# Patient Record
Sex: FEMALE | Race: WHITE | NOT HISPANIC OR LATINO
[De-identification: names, ages, dates, MRNs, and addresses within clinical notes are randomized per-mention and may not be internally consistent; named-entity substitution may affect disease eponyms.]

---

## 2019-02-01 PROBLEM — Z00.00 ENCOUNTER FOR PREVENTIVE HEALTH EXAMINATION: Status: ACTIVE | Noted: 2019-02-01

## 2019-02-07 ENCOUNTER — APPOINTMENT (OUTPATIENT)
Dept: HEART AND VASCULAR | Facility: CLINIC | Age: 79
End: 2019-02-07
Payer: MEDICARE

## 2019-02-07 VITALS
BODY MASS INDEX: 23.91 KG/M2 | DIASTOLIC BLOOD PRESSURE: 70 MMHG | OXYGEN SATURATION: 95 % | HEIGHT: 60.63 IN | SYSTOLIC BLOOD PRESSURE: 120 MMHG | WEIGHT: 125 LBS | HEART RATE: 70 BPM

## 2019-02-07 VITALS — DIASTOLIC BLOOD PRESSURE: 70 MMHG | SYSTOLIC BLOOD PRESSURE: 100 MMHG

## 2019-02-07 DIAGNOSIS — E78.5 HYPERLIPIDEMIA, UNSPECIFIED: ICD-10-CM

## 2019-02-07 DIAGNOSIS — Z85.72 PERSONAL HISTORY OF NON-HODGKIN LYMPHOMAS: ICD-10-CM

## 2019-02-07 DIAGNOSIS — R09.89 OTHER SPECIFIED SYMPTOMS AND SIGNS INVOLVING THE CIRCULATORY AND RESPIRATORY SYSTEMS: ICD-10-CM

## 2019-02-07 DIAGNOSIS — R00.2 PALPITATIONS: ICD-10-CM

## 2019-02-07 DIAGNOSIS — F17.200 NICOTINE DEPENDENCE, UNSPECIFIED, UNCOMPLICATED: ICD-10-CM

## 2019-02-07 DIAGNOSIS — C85.90 NON-HODGKIN LYMPHOMA, UNSPECIFIED, UNSPECIFIED SITE: ICD-10-CM

## 2019-02-07 PROCEDURE — 99204 OFFICE O/P NEW MOD 45 MIN: CPT

## 2019-02-07 PROCEDURE — 93000 ELECTROCARDIOGRAM COMPLETE: CPT

## 2019-02-07 NOTE — HISTORY OF PRESENT ILLNESS
[FreeTextEntry1] : 77 yo woman with h/o moderate AS (last ECHO in 2015 with ROD=1.2 cm2) and palpitation with previous Holter showing brief episodes of AT but no definitive Afib here for evaluation.  She reports somewhat decreased ET c/w 2015 although she still exercise 3-4 times in the gym including walking about 10-15 min on the treadmill at low speed.  No CP with mild BRITTON.  Does note frequent episodes of palpitation, 2-3 times per week, "fast heart beat", without dizziness or syncope, lasting a few seconds, with spontaneous resolution, usually happening in the morning.  Relates to intermittent feelings of "coldness", in bilateral legs and feet, especially at night.  Occasional muscle cramps in the leg.  No ulcer or gangrene.  No PND, orthopnea or LE edema.

## 2019-02-07 NOTE — REVIEW OF SYSTEMS
[Shortness Of Breath] : shortness of breath [Palpitations] : palpitations [Fever] : no fever [Chills] : no chills [Blurry Vision] : no blurred vision [Earache] : no earache [Chest Pain] : no chest pain [Cough] : no cough [Wheezing] : no wheezing [Abdominal Pain] : no abdominal pain [Nausea] : no nausea [Vomiting] : no vomiting [Heartburn] : no heartburn [Joint Pain] : no joint pain [Skin: A Rash] : no rash: [Dizziness] : no dizziness [Confusion] : no confusion was observed [Excessive Thirst] : no polydipsia [Easy Bleeding] : no tendency for easy bleeding [Easy Bruising] : no tendency for easy bruising

## 2019-02-07 NOTE — ASSESSMENT
[FreeTextEntry1] : 1. Moderate AS: on previous ECHO from 2015, now with mildly decreased ET by report, need to rule out progression.\par 2. Palpitation: AT on previous Holter, need to repeat to rule out Afib.\par 3. Decreased LLE pulses: suspicious for underlying PAD.\par \par Plan:\par 1. ECHO.\par 2. Event monitor for one week.\par 3. LIAM/PVR.\par 4. RTC in one month.
none

## 2019-02-07 NOTE — PHYSICAL EXAM
[General Appearance - Well Developed] : well developed [General Appearance - Well Nourished] : well nourished [Normal Conjunctiva] : the conjunctiva exhibited no abnormalities [Normal Oral Mucosa] : normal oral mucosa [JVD Elevated _____cm] : JVD elevated [unfilled] ~U cm above clavicle [1+] : right 1+ [0] : left 0 [2+] : left 2+ [] : no respiratory distress [Auscultation Breath Sounds / Voice Sounds] : lungs were clear to auscultation bilaterally [Bowel Sounds] : normal bowel sounds [Abdomen Tenderness] : non-tender [Abdomen Mass (___ Cm)] : no abdominal mass palpated [Abnormal Walk] : normal gait [Nail Clubbing] : no clubbing of the fingernails [Cyanosis, Localized] : no localized cyanosis [Skin Color & Pigmentation] : normal skin color and pigmentation [Oriented To Time, Place, And Person] : oriented to person, place, and time [FreeTextEntry1] : normal carotid upstroke with AV transmitting sound [Bruit] : no bruit heard

## 2019-02-27 ENCOUNTER — APPOINTMENT (OUTPATIENT)
Dept: HEART AND VASCULAR | Facility: CLINIC | Age: 79
End: 2019-02-27
Payer: MEDICARE

## 2019-02-27 PROCEDURE — 93922 UPR/L XTREMITY ART 2 LEVELS: CPT

## 2019-02-27 PROCEDURE — 93306 TTE W/DOPPLER COMPLETE: CPT

## 2019-02-27 PROCEDURE — 93225 XTRNL ECG REC<48 HRS REC: CPT

## 2019-03-21 ENCOUNTER — APPOINTMENT (OUTPATIENT)
Dept: HEART AND VASCULAR | Facility: CLINIC | Age: 79
End: 2019-03-21
Payer: MEDICARE

## 2019-03-21 VITALS
HEART RATE: 91 BPM | OXYGEN SATURATION: 99 % | SYSTOLIC BLOOD PRESSURE: 131 MMHG | DIASTOLIC BLOOD PRESSURE: 90 MMHG | BODY MASS INDEX: 23.71 KG/M2 | TEMPERATURE: 98.3 F | HEIGHT: 60.63 IN | WEIGHT: 123.99 LBS

## 2019-03-21 PROCEDURE — 99214 OFFICE O/P EST MOD 30 MIN: CPT

## 2019-03-21 NOTE — ASSESSMENT
[FreeTextEntry1] : 1. AS: looks like it has progressed to severe, however, pt adamantly denies symptoms and descriptions of ET are contradictory.\par 2. Palpitation: again with brief Afib, [id number] score of 3 mostly because of age and gender.  Pt is reluctant to take any meds, despite clearly explained the risk of thromboembolism.  Finally, she agrees to take ASA first.\par 3. LE "twitching": without sig PAD by LIAM, ?restless leg syndrome.\par \par Plan:\par 1. EKG exercise stress test to evaluate exercise tolerance and BP response to exercise.\par 2. RTC in 2 weeks.

## 2019-03-21 NOTE — REVIEW OF SYSTEMS
[Fever] : no fever [Chills] : no chills [Blurry Vision] : no blurred vision [Earache] : no earache [Shortness Of Breath] : no shortness of breath [Chest Pain] : no chest pain [Lower Ext Edema] : no extremity edema [Cough] : no cough [Abdominal Pain] : no abdominal pain [Dysuria] : no dysuria [Dizziness] : no dizziness [Confusion] : no confusion was observed [Excessive Thirst] : no polydipsia [Easy Bruising] : no tendency for easy bruising

## 2019-03-21 NOTE — HISTORY OF PRESENT ILLNESS
[FreeTextEntry1] : Feeling about the same with almost daily palpitation lasting a few seconds.  Claims to have normal exercise tolerance without exertional chest pain or BRITTON.  Reports exercise in Gym 3 x/week including 15 min of treadmill but at the same time saying she rarely walks outside of the house.  No PND, orthopnea or LE edema.  No dizziness or syncope.  Again c/o leg "twitching" at night but no pain.  Only takes metoprolol prn and not taking ASA.

## 2019-03-21 NOTE — PHYSICAL EXAM
[General Appearance - Well Developed] : well developed [General Appearance - Well Nourished] : well nourished [Normal Conjunctiva] : the conjunctiva exhibited no abnormalities [Normal Oral Mucosa] : normal oral mucosa [JVD Elevated _____cm] : JVD elevated [unfilled] ~U cm above clavicle [] : no respiratory distress [Auscultation Breath Sounds / Voice Sounds] : lungs were clear to auscultation bilaterally [Heart Rate And Rhythm] : heart rate and rhythm were normal [Bowel Sounds] : normal bowel sounds [Abnormal Walk] : normal gait [Nail Clubbing] : no clubbing of the fingernails [Skin Color & Pigmentation] : normal skin color and pigmentation [Oriented To Time, Place, And Person] : oriented to person, place, and time [FreeTextEntry1] : IV/VI coarse GORDO LSB, faint A2

## 2019-04-04 ENCOUNTER — APPOINTMENT (OUTPATIENT)
Dept: HEART AND VASCULAR | Facility: CLINIC | Age: 79
End: 2019-04-04
Payer: MEDICARE

## 2019-04-04 PROCEDURE — ZZZZZ: CPT

## 2019-04-04 PROCEDURE — 93015 CV STRESS TEST SUPVJ I&R: CPT

## 2019-04-11 ENCOUNTER — APPOINTMENT (OUTPATIENT)
Dept: HEART AND VASCULAR | Facility: CLINIC | Age: 79
End: 2019-04-11
Payer: MEDICARE

## 2019-04-11 VITALS
BODY MASS INDEX: 23.71 KG/M2 | OXYGEN SATURATION: 97 % | DIASTOLIC BLOOD PRESSURE: 70 MMHG | SYSTOLIC BLOOD PRESSURE: 126 MMHG | HEIGHT: 60.63 IN | TEMPERATURE: 98.2 F | WEIGHT: 123.99 LBS | HEART RATE: 79 BPM

## 2019-04-11 DIAGNOSIS — I48.0 PAROXYSMAL ATRIAL FIBRILLATION: ICD-10-CM

## 2019-04-11 PROCEDURE — 99214 OFFICE O/P EST MOD 30 MIN: CPT

## 2019-04-11 NOTE — HISTORY OF PRESENT ILLNESS
[FreeTextEntry1] : Feeling about the same.  Mild BRITTON upon brisk walking but no exertional chest pain, PND, orthonpea, LE edema or dizziness.  Intermittent palpitation, lasting for seconds, without dizziness.

## 2019-04-11 NOTE — ASSESSMENT
[FreeTextEntry1] : 1. Aortic stenosis: severe, but relatively asymptomatic, with normal exercise tolerance and hemodynamics on EST for age.  Case discussed with structural heart disease director Dr. Guo who recommend watchful waiting strategy with every 3 months clinical and every 6 months ECHO follow up.\par 2. Atrial fibrillation: with brief paroxysmal Afib, not on anticoagulation.  [id number] score 2 with only age and gender, given recent data suggesting female gender may not be a risk factor by itself, it is unclear to me that the patient should be on anticoagulation.  Will consult EP.\par 3. Hyperlipidemia: should probably be on statin, especially it may have marginal benefit for aortic stenosis.\par \par Plan:\par 1. EP consult with Dr. William Helton.\par 2. Consider starting Lipitor 20 mg po qd.  Pt would like to discuss with PCP Dr. Zepeda before starting.\par 3. RTC in 3 months in [place] office.  Pt is warned to return earlier if she develops exertional chest pain, dizziness/syncope or worsening dyspnea.

## 2019-04-11 NOTE — REASON FOR VISIT
[Consultation] : a consultation regarding [FreeTextEntry2] : aortic stenosis and atrial fibrillation

## 2019-04-11 NOTE — REVIEW OF SYSTEMS
[Fever] : no fever [Chills] : no chills [Blurry Vision] : no blurred vision [Shortness Of Breath] : shortness of breath [Lower Ext Edema] : no extremity edema [Chest Pain] : no chest pain [Leg Claudication] : no intermittent leg claudication [Abdominal Pain] : no abdominal pain [Cough] : no cough [Palpitations] : palpitations [Dysuria] : no dysuria [Joint Pain] : no joint pain [Confusion] : no confusion was observed [Dizziness] : no dizziness [Excessive Thirst] : no polydipsia [Easy Bleeding] : no tendency for easy bleeding

## 2019-04-11 NOTE — PHYSICAL EXAM
[General Appearance - Well Developed] : well developed [Normal Conjunctiva] : the conjunctiva exhibited no abnormalities [Normal Oral Mucosa] : normal oral mucosa [General Appearance - Well Nourished] : well nourished [JVD Elevated _____cm] : JVD elevated [unfilled] ~U cm above clavicle [Auscultation Breath Sounds / Voice Sounds] : lungs were clear to auscultation bilaterally [] : no respiratory distress [Bowel Sounds] : normal bowel sounds [Abnormal Walk] : normal gait [Cyanosis, Localized] : no localized cyanosis [FreeTextEntry1] : No edema [Nail Clubbing] : no clubbing of the fingernails [Skin Color & Pigmentation] : normal skin color and pigmentation [Oriented To Time, Place, And Person] : oriented to person, place, and time

## 2019-04-18 PROBLEM — I48.0 PAROXYSMAL ATRIAL FIBRILLATION: Status: ACTIVE | Noted: 2019-04-18

## 2021-11-06 ENCOUNTER — EMERGENCY (EMERGENCY)
Facility: HOSPITAL | Age: 81
LOS: 1 days | Discharge: LEFT BEFORE DISCHARGE | End: 2021-11-06
Attending: EMERGENCY MEDICINE | Admitting: EMERGENCY MEDICINE
Payer: MEDICARE

## 2021-11-06 VITALS
DIASTOLIC BLOOD PRESSURE: 53 MMHG | TEMPERATURE: 98 F | HEART RATE: 61 BPM | OXYGEN SATURATION: 97 % | RESPIRATION RATE: 18 BRPM | SYSTOLIC BLOOD PRESSURE: 118 MMHG

## 2021-11-06 DIAGNOSIS — Z20.822 CONTACT WITH AND (SUSPECTED) EXPOSURE TO COVID-19: ICD-10-CM

## 2021-11-06 DIAGNOSIS — R77.8 OTHER SPECIFIED ABNORMALITIES OF PLASMA PROTEINS: ICD-10-CM

## 2021-11-06 DIAGNOSIS — R07.9 CHEST PAIN, UNSPECIFIED: ICD-10-CM

## 2021-11-06 DIAGNOSIS — I48.91 UNSPECIFIED ATRIAL FIBRILLATION: ICD-10-CM

## 2021-11-06 DIAGNOSIS — J90 PLEURAL EFFUSION, NOT ELSEWHERE CLASSIFIED: ICD-10-CM

## 2021-11-06 DIAGNOSIS — J81.1 CHRONIC PULMONARY EDEMA: ICD-10-CM

## 2021-11-06 PROCEDURE — 99285 EMERGENCY DEPT VISIT HI MDM: CPT

## 2021-11-06 PROCEDURE — 93010 ELECTROCARDIOGRAM REPORT: CPT

## 2021-11-06 NOTE — ED ADULT TRIAGE NOTE - ARRIVAL INFO ADDITIONAL COMMENTS
pt c/o chest pain, dizziness and intermittent vomiting for 2 days.   pt used "a machine" that told them she possibly had Afib

## 2021-11-06 NOTE — ED ADULT NURSE NOTE - OBJECTIVE STATEMENT
Pt presented to the ED with complaints of chest pain for the last two week. As per pt, chest pain has worsen which prompted her to come to the ED. Pt is accompanied by daughter who patients wants to interpret. Pt is alert and oriented, ambulatory, able to move all extremities, denies fever, nausea, vomiting. lightheadedness, or dizziness.

## 2021-11-06 NOTE — ED ADULT NURSE NOTE - SKIN TEMPERATURE MOISTURE
[facility] CRITICAL CARE SERVICE     PROGRESS NOTE    Patient: Josh Arenas Date: 9/12/2021   female, 84 year old  Admit Date: 9/10/2021   Attending: Loi Graf MD Primary Care Physician: Vitaliy Souza MD       ICU admission Date: 9/10/2021      Operation: S/P Right mini thoracotomy tricuspid valve replacement (31mm Mosaic tissue valve), right groin access for cardiopulmonary bypass 9/10/2021     Post-operative Day: 2     Summary: See Below                                                                                   SUMMARY OF PLAN    1. POD 2 TVreplacement: ASA / b-blocker  2. Cough: guaifenesin 1200 mg po q12h  3. Hypoxemia: resolving, on room air  4. Fluid overload: furosemide  5. Will follow peripherally for respiratory issues  -----------------------------     ----------------------------------------------------------------------------------------------------------------------------------                                   Comprehensive Problem List      Principal Problem: Tricuspid valve regurgitation [I07.1]  S/P TVR (tricuspid valve repair) [Z98.890]   ICU admission Date: 9/10/2021    LOS: 2 day           Operation: S/P Right mini thoracotomy tricuspid valve replacement (31mm Mosaic tissue valve), right groin access for cardiopulmonary bypass 9/10/2021   Operation Date: 9/10/2021   Post-operative Day: 2            Neuro/Psych:   · Acute post operative pain: acetaminophen 650 mg po qid  · degenerative joint disease  · Trigeminal neuralgia  -insomnia prophylaxis/sleep hygiene  Cardiovascular:   · S/P Right mini thoracotomy tricuspid valve replacement (31mm Mosaic tissue valve), right groin access for cardiopulmonary bypass 9/10/2021: ASA 81 mg po qdaily, metoprolol 25 mg po q12h  · Chronic systolic heart failure  · Chronic atrial fibrillation: apixaBAN (ELIQUIS) 5 MG Tab po q12h  · Cardiomyopathy  · Pulmonary hypertension, mild  · 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  Renal/Metabolic:   Weight: 71 kg -> Weight: 72.2 kg, Admit: Weight: 70.3 kg; Weight change: 1.892 kg  · Fluid overload  · Azotemia  · Chronic kidney disease, stage 3, mod decreased GFR  · Elevated Creatinine     Temp  Min: 97.4 °F (36.3 °C)  Max: 99 °F (37.2 °C);   Lab Results   Component Value Date    WBC 14.5 (H) 09/12/2021   , Resp  Min: 9  Max: 47;   Lab Results   Component Value Date    APCO2 45 09/11/2021   ; Pulse  Min: 73  Max: 106    Disposition/Goals of Care:   -Code status: Full Code \"  Code Status: Full Resuscitation\"  -Goals of Care: no care limitations noted  ============================================================================    Subjective  Pain controlled and Tolerating diet    Daily Review of Systems  Respiratory:  negative  Cardiovascular:  negative  Gastrointestinal:  negative    Physical Exam ( high: 12 elements; moderate: 6 )  Hemodynamic parameters for last 24 hours:  PAP (mmHg): (35-47)/(12-18) 36/13  CVP:  [4 mmHg-18 mmHg] 16 mmHg  CO:  [4.6 l/min-5.3 l/min] 5.1 l/min  CI:  [2.6 l/min/m2-3 l/min/m2] 2.8 l/min/m2  Vital signs in last 24 hours:  Temp:  [97.4 °F (36.3 °C)-99 °F (37.2 °C)] 97.6 °F (36.4 °C)  Heart Rate:  [] 100  Resp:  [9-47] 23  BP: ()/(51-69) 131/69  Arterial Line BP: ()/(44-63) 69/58  General appearance: Well developed and no acute distress  Neurologic exam: Glascow coma scale:  Eye opening-  4: Spontaneously, Verbal- 5:  oriented, Motor-  6: Obeys commands  CV exam: RRR, with S1, S2 and perfusion: extremities warm, capillary refill < 3 seconds  Thoracic Exam: clear to ausultation, no wheezes, no rubs, no rhonchi, rales  Abdominal Exam:soft, non-tender, not distended, no guarding or rebound  Eye: non-icteric, eomi  Skin: warm, dry and intact      Reviewed Data Points:  Allergies, Medications, Labs, Imaging and Physician and Nursing Notes  ================================================================  Data  (all assessments, except rhythm, is data obtained from medical record and does not represent data personally collected by author, except the defined cardiac rhythm.)   Neuro/Psych:    RN Assessment Last Value 24 Hour Range   GCS Score 15 (09/12/21 0800)  GCS Score  Min: 15  Max: 15   BIS   No data recorded   ICP      No data recorded  No data recorded     GCS  Is Patient Receiving Medication to Decrease LOC?: No (09/12/21 0800)  Symptoms of Increased ICP: None (09/12/21 0800)  Eye Opening: Spontaneous (09/12/21 0800)  Verbal Response: Oriented and converses (09/12/21 0800)  Motor Response: Obeys verbal commands (09/12/21 0800)  GCS Score: 15 (09/12/21 0800)   Eye Spontaneous (09/12/21 0800)    Verbal Oriented and converses (09/12/21 0800)    Motor Obeys verbal commands (09/12/21 0800)    L Pupil     R Pupil     ICP Symptoms None (09/12/21 0800)   CAM-ICU     EMILY           Level of Consciousness: Lethargic   Pain Score Pain Assessment Tool: Numeric Rating Scale 0-10,         No results found for: OSMO  No results found for this or any previous visit.       Cardio-  Vascular: Sinus rhythm  100 Pulse  Min: 73  Max: 106   Blood Pressure 131/69 BP  Min: 90/53  Max: 131/69   Art BP (!) 69/58 Arterial Line BP  Min: 69/58  Max: 132/63   CVP (!) 16 mmHg CVP (mmHg)  Min: 4 mmHg  Max: 18 mmHg     SvO2 (!) 82 % SVO2  Min: 73 %  Max: 83 %   Cardiac Output 5.1 l/min Cardiac Output (l/min)  Min: 4.6 l/min  Max: 5.3 l/min   Cardiac index 2.8  l/min/m2 Cardiac Index (l/min/m2)  Min: 2.6 l/min/m2  Max: 3 l/min/m2     RN Assessment Temporary PACER   Intrinsic Rate     Intrinsic Rhythm     Pacer Mode     Pacer Rate  ,     Atrial Output     Ventricular Output     Capture  ,     Sensing       LVAD      Speed (RPM)     Power (Schneider)     Flow (L/min)      Pulsatility Index        Recent Labs   Lab 09/10/21  1656   VSAT 82*     Lab Results   Component Value Date    RAPDTR <0.02 07/09/2015    CPK 62 03/07/2017     (H) 05/08/2018     (H) 05/09/2016     Results for orders placed or performed during the hospital encounter of 08/23/21   Electrocardiogram 12-Lead   Result Value Ref Range    Ventricular Rate EKG/Min (BPM) 103     Atrial Rate (BPM) 40     QRS-Interval (MSEC) 122     QT-Interval (MSEC) 410     QTc 537     R Axis (Degrees) -120     T Axis (Degrees) 56     REPORT TEXT       Ventricular-paced rhythm  and  with occasional  premature ventricular complexes  Abnormal ECG  When compared with ECG of  20-JUL-2021 16:29,  premature ventricular complexes  are now  present  Confirmed by VARUN HERRMANN ANWER ([id number]) on 8/23/2021 6:49:56 PM         Respiratory:   Respiratory Rate (!) 23 Resp  Min: 9  Max: 47   SpO2 (3 L/min) 93 % SpO2  Min: 92 %  Max: 100 %     O2 Vent Settings Last Value   Mode Pressure Support   Rate 16   Tidal Volume     Peak Inspiratory Pressure     Plateau Pressure 19 cm H2O   Pressure Support 8 cm H20   PEEP/CPAP/EPAP 6 cm H20   FiO2 40 %   IBW  IBW/kg (Calculated) FEMALE: 59.3 (09/10/21 0855)   IBW/kg (Calculated) Male: 63.8 (09/10/21 0855)     Recent Labs   Lab 09/11/21  0438 09/11/21  0012 09/10/21  2236   APH 7.31* 7.30* 7.35   APCO2 45 43 41   APO2 110* 126* 160*   ASAT 98 99 100*   AHCO3 23 21* 23       RT Assessments:   Sputum Quality(most recent):  ,  ,     Incentive Spirometry  ; breaths     IPPB PEP/PAP   Chest PT       Results for orders placed during the hospital encounter of 09/10/21    XR Chest AP or PA    Narrative  XR  CHEST AP OR PA    HISTORY: chest tube removal    COMPARISON: 9/11/2021    FINDINGS:    Removal of right-sided chest tubes. No obvious pneumothorax.    Stable enlargement of cardiac silhouette. Pulmonary arterial line has been  removed. Right jugular line terminates in lower SVC. Stable pacemaker  device. Normal pulmonary vasculature. Moderately improved lung aeration  with minor atelectatic opacities in mid and lower lungs. No effusion. No  acute osseous abnormalities.    Impression  1.  Removal of right-sided chest tubes without obvious pneumothorax.    2.  Moderately improved lung aeration.    3.  Removal of pulmonary arterial line.        Renal: Weight: 72.2 kg, Admit: Weight: 70.3 kg  I/O last 3 completed shifts:  In: 905.9 [P.O.:360; I.V.:545.9]  Out: 940 [Urine:870; Chest Tube:70]    Intake/Output Summary (Last 24 hours) at 9/12/2021 0932  Last data filed at 9/12/2021 0800  Gross per 24 hour   Intake 905.9 ml   Output 800 ml   Net 105.9 ml       Recent Labs   Lab 09/12/21  0321 09/11/21  0433 09/10/21  1644 09/10/21  0957   SODIUM 135  --   --   --    POTASSIUM 4.7  --  3.8 3.9   CHLORIDE 105  --   --   --    CO2 23  --   --   --    BUN 37*  --   --   --    CREATININE 1.96*  --   --   --    GLUCOSE 160*  --   --   --    ANIONGAP 12  --   --   --    MG 2.7* 2.8* 1.8  --        No results found    GI/Hepatic:     [REMOVED] GI Tube 09/10/21-Tube Status: Low intermittent suction          RN Assessment Enteral Nutrition   Type      Rate      Residual      Last BM     Stool           No results found    Invalid input(s): ALKP  MELD-Na score: 15 at 8/23/2021 12:32 PM  MELD score: 15 at 8/23/2021 12:32 PM  Calculated from:  Serum Creatinine: 1.35 mg/dL at 8/23/2021 12:32 PM  Serum Sodium: 137 mmol/L at 8/23/2021 12:32 PM  Total Bilirubin: 1.8 mg/dL at 8/23/2021 12:32 PM  INR(ratio): 1.3 at 8/23/2021 12:32 PM  Age: 84 years  No results found for this or any previous visit.     No results found for this or any previous  visit.      Heme/Onc: Recent Labs   Lab 09/12/21  0321 09/11/21  0434 09/11/21  0433 09/10/21  1644   HGB 10.9*  --  10.9* 11.9*   HCT 34.5*  --  34.8* 36.6   PLT 81*  --  76* 86*   PTT  --   --   --  27   INR 1.3 1.3  --  1.5     Lab Results   Component Value Date    MCV 83.9 09/12/2021    MCHC 31.6 (L) 09/12/2021    MCH 26.5 09/12/2021     No results found for: AT3A  Anti-Embolism Devices  VTE Mechanical Prophylaxis Devices (in use): RLE Below knee (BK) anti-embolism stockings (TEDS), LLE Below knee (BK) anti-embolism stockings (TEDS)  RLE Below knee (BK) anti-embolism stockings (TEDS) : On  LLE Below knee (BK) anti-embolism stockings (TEDS) : On  RLE Intermittent Sequential Compression Device (SCDs): On  LLE Intermittent Sequential Compression Device (SCDs): On  ACT:      Endo: BMI 25.69  Lab Results   Component Value Date    TSH 2.409 07/20/2021    HGBA1C 6.7 (H) 08/23/2021    CHOLESTEROL 131 07/30/2021    TRIGLYCERIDE 62 07/30/2021    HDL 71 07/30/2021    CALCLDL 48 07/30/2021        Recent Labs   Lab 09/11/21  1149 09/11/21  1629 09/11/21  2105   GLUCOSE BEDSIDE 178* 170* 196*       ID/Immuno: Temperature: 97.6 °F (36.4 °C), Temp  Min: 97.4 °F (36.3 °C)  Max: 99 °F (37.2 °C)  Recent Labs   Lab 09/12/21  0321 09/11/21  0433 09/10/21  1644   WBC 14.5* 11.0 9.8     No results found for: CRP, MRSAPC  No results found  Lab Results   Component Value Date    HCAB NEGATIVE 11/04/2018    HSAB NEGATIVE 11/04/2018    HCV NEGATIVE 11/04/2018     Anaerobic & Aerobic culture & Smear  No results found for this or any previous visit.  Blood Culture  Results for orders placed or performed during the hospital encounter of 11/04/18   Blood Culture    Specimen: Blood   Result Value Ref Range    Specimen Description BLOOD LEFT BRENDAN     CULTURE NO GROWTH 5 DAYS.     REPORT STATUS 11/09/2018 FINAL      CSF Cell Count & Diff  No results found for this or any previous visit.   No results found for this or any previous visit.  Urine  Culture  No results found for this or any previous visit.  Urinalysis:  Lab Results   Component Value Date    UPROT Negative 08/23/2021    UGLU Negative 08/23/2021    UROB 0.2 08/23/2021    UWBC Negative 08/23/2021    UNITR Negative 08/23/2021    URBC Small (A) 08/23/2021    UBACTR NONE SEEN 11/04/2018     CVC Double Lumen 09/10/21 Right Jugular (Active)   Clinical Necessity No 09/11/21 2000   Site Assessment WDL 09/12/21 0800   Dressing Type Chlorhexidine patch;Transparent 09/12/21 0800   Dressing Changed On   09/10/21 09/12/21 0800   Port 1 Lumen Cap Applied/Changed On 09/10/21 09/12/21 0800   Port 1 Line Status Infusing 09/12/21 0800   Port 2 Lumen Cap Applied/Changed On 09/10/21 09/12/21 0800   Port 2 Line Status Capped 09/12/21 0800       Peripheral IV 09/10/21 Right Antecubital 20 (Active)   Site Assessment WDL 09/12/21 0800   Dressing Type Transparent 09/12/21 0800   Dressing Activity Applied 09/10/21 0957   Dressing Changed On   09/10/21 09/12/21 0800   Lumen Cap Applied/Changed On 09/10/21 09/12/21 0800   Line Status Line flushed;No blood return 09/12/21 0800   Interventions Capped IV 09/12/21 0800       Chest Tube (Active)      Integument/  Rheum/Rehab: Skin (RN Assessment)  Jon    Wound Cover      Physical Therapy (PT Assessment)                       Social/Ethics: Advanced Directives:               Austin Eduardo MD  9/12/2021         warm

## 2021-11-07 VITALS — SYSTOLIC BLOOD PRESSURE: 135 MMHG | OXYGEN SATURATION: 93 % | HEART RATE: 55 BPM | DIASTOLIC BLOOD PRESSURE: 48 MMHG

## 2021-11-07 LAB
ALBUMIN SERPL ELPH-MCNC: 3.8 G/DL — SIGNIFICANT CHANGE UP (ref 3.3–5)
ALP SERPL-CCNC: 156 U/L — HIGH (ref 40–120)
ALT FLD-CCNC: 160 U/L — HIGH (ref 10–45)
ANION GAP SERPL CALC-SCNC: 14 MMOL/L — SIGNIFICANT CHANGE UP (ref 5–17)
APTT BLD: 23.4 SEC — LOW (ref 27.5–35.5)
AST SERPL-CCNC: 99 U/L — HIGH (ref 10–40)
BASE EXCESS BLDV CALC-SCNC: -2.9 MMOL/L — LOW (ref -2–3)
BASOPHILS # BLD AUTO: 0.04 K/UL — SIGNIFICANT CHANGE UP (ref 0–0.2)
BASOPHILS NFR BLD AUTO: 0.3 % — SIGNIFICANT CHANGE UP (ref 0–2)
BILIRUB SERPL-MCNC: 0.4 MG/DL — SIGNIFICANT CHANGE UP (ref 0.2–1.2)
BUN SERPL-MCNC: 26 MG/DL — HIGH (ref 7–23)
CA-I SERPL-SCNC: 1.08 MMOL/L — LOW (ref 1.15–1.33)
CALCIUM SERPL-MCNC: 8.8 MG/DL — SIGNIFICANT CHANGE UP (ref 8.4–10.5)
CHLORIDE SERPL-SCNC: 103 MMOL/L — SIGNIFICANT CHANGE UP (ref 96–108)
CO2 BLDV-SCNC: 25.1 MMOL/L — SIGNIFICANT CHANGE UP (ref 22–26)
CO2 SERPL-SCNC: 21 MMOL/L — LOW (ref 22–31)
CREAT SERPL-MCNC: 1.04 MG/DL — SIGNIFICANT CHANGE UP (ref 0.5–1.3)
EOSINOPHIL # BLD AUTO: 0.05 K/UL — SIGNIFICANT CHANGE UP (ref 0–0.5)
EOSINOPHIL NFR BLD AUTO: 0.4 % — SIGNIFICANT CHANGE UP (ref 0–6)
GAS PNL BLDV: 127 MMOL/L — LOW (ref 136–145)
GAS PNL BLDV: SIGNIFICANT CHANGE UP
GLUCOSE SERPL-MCNC: 167 MG/DL — HIGH (ref 70–99)
HCO3 BLDV-SCNC: 24 MMOL/L — SIGNIFICANT CHANGE UP (ref 22–29)
HCT VFR BLD CALC: 40.3 % — SIGNIFICANT CHANGE UP (ref 34.5–45)
HGB BLD-MCNC: 13.1 G/DL — SIGNIFICANT CHANGE UP (ref 11.5–15.5)
IMM GRANULOCYTES NFR BLD AUTO: 0.5 % — SIGNIFICANT CHANGE UP (ref 0–1.5)
INR BLD: 1.09 — SIGNIFICANT CHANGE UP (ref 0.88–1.16)
LYMPHOCYTES # BLD AUTO: 0.89 K/UL — LOW (ref 1–3.3)
LYMPHOCYTES # BLD AUTO: 6.8 % — LOW (ref 13–44)
MCHC RBC-ENTMCNC: 30.8 PG — SIGNIFICANT CHANGE UP (ref 27–34)
MCHC RBC-ENTMCNC: 32.5 GM/DL — SIGNIFICANT CHANGE UP (ref 32–36)
MCV RBC AUTO: 94.6 FL — SIGNIFICANT CHANGE UP (ref 80–100)
MONOCYTES # BLD AUTO: 0.73 K/UL — SIGNIFICANT CHANGE UP (ref 0–0.9)
MONOCYTES NFR BLD AUTO: 5.5 % — SIGNIFICANT CHANGE UP (ref 2–14)
NEUTROPHILS # BLD AUTO: 11.39 K/UL — HIGH (ref 1.8–7.4)
NEUTROPHILS NFR BLD AUTO: 86.5 % — HIGH (ref 43–77)
NRBC # BLD: 0 /100 WBCS — SIGNIFICANT CHANGE UP (ref 0–0)
NT-PROBNP SERPL-SCNC: HIGH PG/ML (ref 0–300)
PCO2 BLDV: 47 MMHG — HIGH (ref 39–42)
PH BLDV: 7.31 — LOW (ref 7.32–7.43)
PLATELET # BLD AUTO: 207 K/UL — SIGNIFICANT CHANGE UP (ref 150–400)
PO2 BLDV: <35 MMHG — SIGNIFICANT CHANGE UP (ref 25–45)
POTASSIUM BLDV-SCNC: 8.2 MMOL/L — CRITICAL HIGH (ref 3.5–5.1)
POTASSIUM SERPL-MCNC: 5 MMOL/L — SIGNIFICANT CHANGE UP (ref 3.5–5.3)
POTASSIUM SERPL-SCNC: 5 MMOL/L — SIGNIFICANT CHANGE UP (ref 3.5–5.3)
PROT SERPL-MCNC: 6.7 G/DL — SIGNIFICANT CHANGE UP (ref 6–8.3)
PROTHROM AB SERPL-ACNC: 13 SEC — SIGNIFICANT CHANGE UP (ref 10.6–13.6)
RBC # BLD: 4.26 M/UL — SIGNIFICANT CHANGE UP (ref 3.8–5.2)
RBC # FLD: 14.7 % — HIGH (ref 10.3–14.5)
SAO2 % BLDV: 47.2 % — LOW (ref 67–88)
SARS-COV-2 RNA SPEC QL NAA+PROBE: NEGATIVE — SIGNIFICANT CHANGE UP
SODIUM SERPL-SCNC: 138 MMOL/L — SIGNIFICANT CHANGE UP (ref 135–145)
TROPONIN T SERPL-MCNC: 0.03 NG/ML — HIGH (ref 0–0.01)
WBC # BLD: 13.16 K/UL — HIGH (ref 3.8–10.5)
WBC # FLD AUTO: 13.16 K/UL — HIGH (ref 3.8–10.5)

## 2021-11-07 PROCEDURE — 71045 X-RAY EXAM CHEST 1 VIEW: CPT

## 2021-11-07 PROCEDURE — 83880 ASSAY OF NATRIURETIC PEPTIDE: CPT

## 2021-11-07 PROCEDURE — 96374 THER/PROPH/DIAG INJ IV PUSH: CPT

## 2021-11-07 PROCEDURE — 84484 ASSAY OF TROPONIN QUANT: CPT

## 2021-11-07 PROCEDURE — 84132 ASSAY OF SERUM POTASSIUM: CPT

## 2021-11-07 PROCEDURE — 82330 ASSAY OF CALCIUM: CPT

## 2021-11-07 PROCEDURE — 84295 ASSAY OF SERUM SODIUM: CPT

## 2021-11-07 PROCEDURE — 71275 CT ANGIOGRAPHY CHEST: CPT | Mod: MA

## 2021-11-07 PROCEDURE — 85025 COMPLETE CBC W/AUTO DIFF WBC: CPT

## 2021-11-07 PROCEDURE — 71275 CT ANGIOGRAPHY CHEST: CPT | Mod: 26,MA

## 2021-11-07 PROCEDURE — 87635 SARS-COV-2 COVID-19 AMP PRB: CPT

## 2021-11-07 PROCEDURE — 82803 BLOOD GASES ANY COMBINATION: CPT

## 2021-11-07 PROCEDURE — 85730 THROMBOPLASTIN TIME PARTIAL: CPT

## 2021-11-07 PROCEDURE — 36415 COLL VENOUS BLD VENIPUNCTURE: CPT

## 2021-11-07 PROCEDURE — 99284 EMERGENCY DEPT VISIT MOD MDM: CPT | Mod: 25

## 2021-11-07 PROCEDURE — 85610 PROTHROMBIN TIME: CPT

## 2021-11-07 PROCEDURE — 71045 X-RAY EXAM CHEST 1 VIEW: CPT | Mod: 26

## 2021-11-07 PROCEDURE — 93005 ELECTROCARDIOGRAM TRACING: CPT

## 2021-11-07 PROCEDURE — 80053 COMPREHEN METABOLIC PANEL: CPT

## 2021-11-07 RX ORDER — FUROSEMIDE 40 MG
20 TABLET ORAL ONCE
Refills: 0 | Status: COMPLETED | OUTPATIENT
Start: 2021-11-07 | End: 2021-11-07

## 2021-11-07 RX ADMIN — Medication 20 MILLIGRAM(S): at 02:04

## 2021-11-07 NOTE — ED PROVIDER NOTE - PROGRESS NOTE DETAILS
[place]: PT states her pain resolved and her breathing improved. WBC 13, alk phos 156, AST 99,  , trop 0.03, BNP 78538. CTA prelim showing "1.  No pulmonary embolism.  2.  Pulmonary edema.  3.  Large right and moderate left pleural effusions.  4.  Main pulmonary artery dilation which can be seen with pulmonary hypertension.  5.  Cardiomegaly.  Pt w/ likely new CHF, possible 2/2 valvular disease/ACS. Joanne:  Pt is clinically sober, A&Ox3, free from distracting injury. Throughout our interactions in the Emergency Department today, the pt has demonstrated concrete thinking/reasoning, has maintained an orderly & reasonable conversation, appears to have intact insight/judgment/reason, a sound mind & therefore in my opinion has capacity now to make decisions.  Given the pt’s presentation, I explained, in laymen's terms, my concern for  MI/ACS/CHF/pulmonary edema.  The pt verbalized an understanding of my worries. I've communicated to the pt that the ED evaluation is incomplete.  I have discussed the need for further ED/hospital w/u.  I have reviewed the range of possible dx, potential testing & tx options.  Our discussions included the potential outcomes of leaving AMA, including worsening of their condition, becoming permanently disabled/in pain/critically ill, & death.  Despite these efforts, I was unable to persuade the pt to stay.  The pt is refusing any further ED care & is leaving AMA. I have attempted to offer tx/rx/guidance for any dangerous conditions which are most likely and/or dangerous.  I have answered all questions & have implored the pt to return to the ED ASAP to complete the w/u.

## 2021-11-07 NOTE — ED PROVIDER NOTE - CARE PLAN
1 Principal Discharge DX:	Elevated troponin  Secondary Diagnosis:	Pulmonary edema  Secondary Diagnosis:	Pleural effusion

## 2021-11-07 NOTE — ED PROVIDER NOTE - CLINICAL SUMMARY MEDICAL DECISION MAKING FREE TEXT BOX
81F PMH afib (on metoprolol, no AC), "valvular disease," p/w intermittent midsternal CP, x2-3w, intermittent. Associated w/ SOB. Worse tonight so came to ED. Today w NBNB NV x2. Asa 324 given by EMS. No other systemic symptoms. Pt has not seen cards Dr. Parsons in >1.5yrs but has f/u this wednesday. No other systemic symptoms. Vitals wnl, exam as above.  ddx: concern for ACS, possible heart failure/worsening valvular disease.  Labs, XR, reassess.

## 2021-11-07 NOTE — ED PROVIDER NOTE - OBJECTIVE STATEMENT
[place], prefers daughter at bedside to translate   81F PMH afib (on metoprolol, no AC), "valvular disease," p/w intermittent midsternal CP, x2-3w, intermittent. Associated w/ SOB. Worse tonight so came to ED. Today w NBNB NV x2. Asa 324 given by EMS. No other systemic symptoms. Pt has not seen cards Dr. Parsons in >1.5yrs but has f/u this wednesday. No other systemic symptoms.   Denies associated diarrhea, lightheaded, diaphoresis, palpitations, cough, rhinorrhea, black stool, bloody stool, LE pain, LE swelling, focal weakness/numbness, recent travel/immobilization, abd pain, urinary complaints, f/c.

## 2021-11-07 NOTE — ED PROVIDER NOTE - NSFOLLOWUPINSTRUCTIONS_ED_ALL_ED_FT
I am highly concerned that you have fluid building up in your lungs. This can be caused from a heart attack or your heart not pumping well or from damaged valves. I recommended that you stay in the hospital for further care but you refused - you understand that by going home you can die or have permanent disability. You should return to the ER as soon as possible or at the very least follow up with your cardiologist as soon as possible.     Can take tylenol 650mg every 6hrs as needed for pain.  Follow up with primary doctor within 1-2 days.      Nonspecific Chest Pain  Chest pain can be caused by many different conditions. It can be caused by a condition that is life-threatening and requires treatment right away. It can also be caused by something that is not life-threatening. If you have chest pain, it can be hard to know the difference, so it is important to get help right away to make sure that you do not have a serious condition.    Some life-threatening causes of chest pain include:  Heart attack.  A tear in the body's main blood vessel (aortic dissection).  Inflammation around your heart (pericarditis).  A problem in the lungs, such as a blood clot (pulmonary embolism) or a collapsed lung (pneumothorax).    Some non life-threatening causes of chest pain include:  Heartburn.  Anxiety or stress.  Damage to the bones, muscles, and cartilage that make up your chest wall.  Pneumonia or bronchitis.  Shingles infection (varicella-zoster virus).    Chest pain can feel like:  Pain or discomfort on the surface of your chest or deep in your chest.  Crushing, pressure, aching, or squeezing pain.  Burning or tingling.  Dull or sharp pain that is worse when you move, cough, or take a deep breath.  Pain or discomfort that is also felt in your back, neck, jaw, shoulder, or arm, or pain that spreads to any of these areas.  Your chest pain may come and go. It may also be constant. Your health care provider will do lab tests and other studies to find the cause of your pain. Treatment will depend on the cause of your chest pain.    Follow these instructions at home:  Pay attention to any changes in your symptoms. Tell your health care provider about them or any new symptoms. Follow these instructions from your health care provider.    Medicines   Take over-the-counter and prescription medicines only as told by your health care provider.  If you were prescribed an antibiotic, take it as told by your health care provider. Do not stop taking the antibiotic even if you start to feel better.    Lifestyle    Rest as directed by your health care provider.  Do not use any products that contain nicotine or tobacco, such as cigarettes and e-cigarettes. If you need help quitting, ask your health care provider.  Do not drink alcohol.  Make healthy lifestyle choices as recommended. These may include:  Getting regular exercise. Ask your health care provider to suggest some activities that are safe for you.  Eating a heart-healthy diet. This includes plenty of fresh fruits and vegetables, whole grains, low-fat (lean) protein, and low-fat dairy products. A dietitian can help you find healthy eating options.  Maintaining a healthy weight.  Managing any other health conditions you have, such as hypertension or diabetes.  Reducing stress, such as with yoga or relaxation techniques.    General instructions   Avoid any activities that bring on chest pain.  Keep all follow-up visits as told by your health care provider. This is important. This includes visits for any further testing if your chest pain does not go away.    Contact a health care provider if:  Your chest pain does not go away.  You feel depressed.  You have a fever.    Get help right away if:  Your chest pain gets worse.  You have a cough that gets worse, or you cough up blood.  You have severe pain in your abdomen.  You faint.  You have sudden, unexplained chest discomfort.  You have sudden, unexplained discomfort in your arms, back, neck, or jaw.  You have shortness of breath at any time.  You suddenly start to sweat, or your skin gets clammy.  You feel nausea or you vomit.  You suddenly feel lightheaded or dizzy.  You have severe weakness, or unexplained weakness or fatigue.  Your heart begins to beat quickly, or it feels like it is skipping beats.

## 2021-11-09 ENCOUNTER — INPATIENT (INPATIENT)
Facility: HOSPITAL | Age: 81
LOS: 2 days | Discharge: HOME HEALTH | DRG: 319 | End: 2021-11-12
Attending: INTERNAL MEDICINE | Admitting: INTERNAL MEDICINE
Payer: MEDICARE

## 2021-11-09 VITALS
WEIGHT: 128.09 LBS | OXYGEN SATURATION: 96 % | DIASTOLIC BLOOD PRESSURE: 56 MMHG | TEMPERATURE: 99 F | HEART RATE: 18 BPM | SYSTOLIC BLOOD PRESSURE: 107 MMHG | RESPIRATION RATE: 20 BRPM

## 2021-11-09 DIAGNOSIS — I48.91 UNSPECIFIED ATRIAL FIBRILLATION: ICD-10-CM

## 2021-11-09 DIAGNOSIS — I35.0 NONRHEUMATIC AORTIC (VALVE) STENOSIS: ICD-10-CM

## 2021-11-09 DIAGNOSIS — R63.8 OTHER SYMPTOMS AND SIGNS CONCERNING FOOD AND FLUID INTAKE: ICD-10-CM

## 2021-11-09 LAB
ALBUMIN SERPL ELPH-MCNC: 3.7 G/DL — SIGNIFICANT CHANGE UP (ref 3.3–5)
ALP SERPL-CCNC: 120 U/L — SIGNIFICANT CHANGE UP (ref 40–120)
ALT FLD-CCNC: 87 U/L — HIGH (ref 10–45)
ANION GAP SERPL CALC-SCNC: 10 MMOL/L — SIGNIFICANT CHANGE UP (ref 5–17)
APTT BLD: 28.3 SEC — SIGNIFICANT CHANGE UP (ref 27.5–35.5)
AST SERPL-CCNC: 39 U/L — SIGNIFICANT CHANGE UP (ref 10–40)
BASOPHILS # BLD AUTO: 0.03 K/UL — SIGNIFICANT CHANGE UP (ref 0–0.2)
BASOPHILS NFR BLD AUTO: 0.3 % — SIGNIFICANT CHANGE UP (ref 0–2)
BILIRUB SERPL-MCNC: 0.4 MG/DL — SIGNIFICANT CHANGE UP (ref 0.2–1.2)
BLD GP AB SCN SERPL QL: NEGATIVE — SIGNIFICANT CHANGE UP
BUN SERPL-MCNC: 15 MG/DL — SIGNIFICANT CHANGE UP (ref 7–23)
CALCIUM SERPL-MCNC: 8.7 MG/DL — SIGNIFICANT CHANGE UP (ref 8.4–10.5)
CHLORIDE SERPL-SCNC: 105 MMOL/L — SIGNIFICANT CHANGE UP (ref 96–108)
CO2 SERPL-SCNC: 26 MMOL/L — SIGNIFICANT CHANGE UP (ref 22–31)
CREAT SERPL-MCNC: 0.92 MG/DL — SIGNIFICANT CHANGE UP (ref 0.5–1.3)
EOSINOPHIL # BLD AUTO: 0.15 K/UL — SIGNIFICANT CHANGE UP (ref 0–0.5)
EOSINOPHIL NFR BLD AUTO: 1.6 % — SIGNIFICANT CHANGE UP (ref 0–6)
GLUCOSE SERPL-MCNC: 123 MG/DL — HIGH (ref 70–99)
HCT VFR BLD CALC: 38.6 % — SIGNIFICANT CHANGE UP (ref 34.5–45)
HGB BLD-MCNC: 12.7 G/DL — SIGNIFICANT CHANGE UP (ref 11.5–15.5)
IMM GRANULOCYTES NFR BLD AUTO: 0.3 % — SIGNIFICANT CHANGE UP (ref 0–1.5)
INR BLD: 1.16 — SIGNIFICANT CHANGE UP (ref 0.88–1.16)
LYMPHOCYTES # BLD AUTO: 1.07 K/UL — SIGNIFICANT CHANGE UP (ref 1–3.3)
LYMPHOCYTES # BLD AUTO: 11.7 % — LOW (ref 13–44)
MCHC RBC-ENTMCNC: 31.4 PG — SIGNIFICANT CHANGE UP (ref 27–34)
MCHC RBC-ENTMCNC: 32.9 GM/DL — SIGNIFICANT CHANGE UP (ref 32–36)
MCV RBC AUTO: 95.5 FL — SIGNIFICANT CHANGE UP (ref 80–100)
MONOCYTES # BLD AUTO: 0.92 K/UL — HIGH (ref 0–0.9)
MONOCYTES NFR BLD AUTO: 10.1 % — SIGNIFICANT CHANGE UP (ref 2–14)
NEUTROPHILS # BLD AUTO: 6.92 K/UL — SIGNIFICANT CHANGE UP (ref 1.8–7.4)
NEUTROPHILS NFR BLD AUTO: 76 % — SIGNIFICANT CHANGE UP (ref 43–77)
NRBC # BLD: 0 /100 WBCS — SIGNIFICANT CHANGE UP (ref 0–0)
NT-PROBNP SERPL-SCNC: 9840 PG/ML — HIGH (ref 0–300)
PLATELET # BLD AUTO: 211 K/UL — SIGNIFICANT CHANGE UP (ref 150–400)
POTASSIUM SERPL-MCNC: 3.9 MMOL/L — SIGNIFICANT CHANGE UP (ref 3.5–5.3)
POTASSIUM SERPL-SCNC: 3.9 MMOL/L — SIGNIFICANT CHANGE UP (ref 3.5–5.3)
PROT SERPL-MCNC: 6.4 G/DL — SIGNIFICANT CHANGE UP (ref 6–8.3)
PROTHROM AB SERPL-ACNC: 13.8 SEC — HIGH (ref 10.6–13.6)
RBC # BLD: 4.04 M/UL — SIGNIFICANT CHANGE UP (ref 3.8–5.2)
RBC # FLD: 14.9 % — HIGH (ref 10.3–14.5)
RH IG SCN BLD-IMP: POSITIVE — SIGNIFICANT CHANGE UP
SARS-COV-2 RNA SPEC QL NAA+PROBE: NEGATIVE — SIGNIFICANT CHANGE UP
SODIUM SERPL-SCNC: 141 MMOL/L — SIGNIFICANT CHANGE UP (ref 135–145)
TROPONIN T SERPL-MCNC: 0.05 NG/ML — CRITICAL HIGH (ref 0–0.01)
WBC # BLD: 9.12 K/UL — SIGNIFICANT CHANGE UP (ref 3.8–10.5)
WBC # FLD AUTO: 9.12 K/UL — SIGNIFICANT CHANGE UP (ref 3.8–10.5)

## 2021-11-09 PROCEDURE — 71045 X-RAY EXAM CHEST 1 VIEW: CPT | Mod: 26

## 2021-11-09 PROCEDURE — 99285 EMERGENCY DEPT VISIT HI MDM: CPT

## 2021-11-09 PROCEDURE — 99223 1ST HOSP IP/OBS HIGH 75: CPT

## 2021-11-09 PROCEDURE — 93010 ELECTROCARDIOGRAM REPORT: CPT

## 2021-11-09 RX ORDER — FUROSEMIDE 40 MG
20 TABLET ORAL ONCE
Refills: 0 | Status: COMPLETED | OUTPATIENT
Start: 2021-11-09 | End: 2021-11-09

## 2021-11-09 RX ORDER — METOPROLOL TARTRATE 50 MG
1 TABLET ORAL
Qty: 0 | Refills: 0 | DISCHARGE

## 2021-11-09 RX ADMIN — Medication 20 MILLIGRAM(S): at 18:20

## 2021-11-09 NOTE — H&P ADULT - NSHPADDITIONALINFOADULT_GEN_ALL_CORE
Attending Addendum:  Pt seen and examined on 11/10/2021. Pls see my addendum to 11/10 progress note.  ABD

## 2021-11-09 NOTE — ED ADULT TRIAGE NOTE - CHIEF COMPLAINT QUOTE
Brought in by family for aortic valve surgery, as per family had an echo done which showed the valve is not working. Patient complaining of palpitations.

## 2021-11-09 NOTE — H&P ADULT - PROBLEM SELECTOR PLAN 2
Hx of Atrial fibrillation. Never on AC. Reports previously on metoprolol but has not taken in > 1 year. Denies palpitations at this time but says she occasionally feels her heart racing  - EKG NSR  - continue to monitor for possible rate control  - continue to hold AC at this time i/s/o possible surgical intervention      F: none  E: replete prn  N: NPO midnight for possible intervention   DVTppx:   code: full code  Dispo: cardiac tele Hx of Atrial fibrillation. Never on AC. Reports previously on metoprolol but has not taken in > 1 year. Denies palpitations at this time but says she occasionally feels her heart racing  - EKG NSR  - continue to monitor for possible rate control  - continue to hold AC at this time i/s/o possible surgical intervention      F: none  E: replete prn  N: NPO midnight for possible intervention   code: full code  Dispo: cardiac tele

## 2021-11-09 NOTE — H&P ADULT - ASSESSMENT
Patient is an 86 year old female with a hx of A fib and aortic stenosis presenting to the ED from her outpatient cardiologist for SOB and BRITTON in the setting of worsening aortic stenosis for intervention.

## 2021-11-09 NOTE — ED PROVIDER NOTE - CLINICAL SUMMARY MEDICAL DECISION MAKING FREE TEXT BOX
80 y/o F presents from cardiologist Dr. Parsons for severe aortic stenosis. On exam, pt weak appearing, has loud systolic murmur. Xray congested. Pending BNP and troponin. Cardiothoracic spoken to and knows pt is coming in, recommends admission Cardiology and consult for surgical treatment.

## 2021-11-09 NOTE — CONSULT NOTE ADULT - ASSESSMENT
Assesment:  82 y/o F w/ Hx of A-Fib (previously on metoprolol, but says she has not taken it in >1 year and no anti-coagulant use), and aortic stenosis, past visit on 11/6/21 for SOB and weakness and found to have congestive heart failure. Concern for worsening aortic stenosis at the time but signed out AMA. Pt followed up w/ Dr. Parsons (cardiology), echo was done in office and found aortic stenosis to be critical at this point so pt presents to ED for evaluation today. Pt endorses a hard time lying flat on her back and has dyspnea on exertion, fatigue, chest pain, and occasionally feels her heart racing for ~1month and increasingly worse the past 4 days. Reports last year she was told she may need surgery in ~ year but felt fine. Denies hx of CAD, HTN, HLD. Otherwise denies lower extremity swelling, HA, blurry vision, cough, fever, chills, sputum production. Denies taking any medication besides vitamins.  Structural heart disease team was consulted for her critical AS. Patient is adamant about having surgery tomorrow and not staying long in the hospital very long.     Plan:  Problem 1: aortic stenosis  - critical AS, referred by Dr. Parsons for BRITTON and worsening echo  - c/w diuresis  - f/u TTE  - need TAVR workup once optimized from fluid standpoint  - d/w Dr. Hernández    Problem 2: fluid overload 2/2 CHF?  - CXR looks wet, BRITTON with small walk in ED  - no peripheral edema  - c/w diuresis    Problem 3: atrial fibrillation  - noncompliant with home meds  - EKG in NSR - will continue to monitor  - no AC in setting of possible surgical intervention    I have reviewed clinical labs tests and reports, radiology tests and reports, as well as old patient medical records, and discussed with the refering physician.     Assesment:  82 y/o F w/ Hx of A-Fib (previously on metoprolol, but says she has not taken it in >1 year and no anti-coagulant use), and aortic stenosis, past visit on 11/6/21 for SOB and weakness and found to have congestive heart failure. Concern for worsening aortic stenosis at the time but signed out AMA. Pt followed up w/ Dr. Parsons (cardiology), echo was done in office and found aortic stenosis to be critical at this point so pt presents to ED for evaluation today. Pt endorses a hard time lying flat on her back and has dyspnea on exertion, fatigue, chest pain, and occasionally feels her heart racing for ~1month and increasingly worse the past 4 days. Reports last year she was told she may need surgery in ~ year but felt fine. Denies hx of CAD, HTN, HLD. Otherwise denies lower extremity swelling, HA, blurry vision, cough, fever, chills, sputum production. Denies taking any medication besides vitamins.  Structural heart disease team was consulted for her critical AS. Patient is adamant about having surgery tomorrow and not staying long in the hospital very long.     Plan:  Problem 1: aortic stenosis  - critical AS, referred by Dr. Parsons for BRITTON and worsening echo  - c/w diuresis  - f/u TTE  - need TAVR workup once optimized from fluid standpoint - possible TAVR CT scans tomorrow  - d/w Dr. Hernández    Problem 2: fluid overload 2/2 CHF?  - CXR looks wet, BRITTON with small walk in ED  - no peripheral edema  - c/w diuresis    Problem 3: atrial fibrillation  - noncompliant with home meds  - EKG in NSR - will continue to monitor  - no AC in setting of possible surgical intervention    I have reviewed clinical labs tests and reports, radiology tests and reports, as well as old patient medical records, and discussed with the refering physician.

## 2021-11-09 NOTE — PATIENT PROFILE ADULT - DOMESTIC TRAVEL HIGH RISK QUESTION
Message to provider:  Patient LVM stating \" I was at pharmacy to  my amphetamine-dextroamphetamine (Adderall) 10 MG tablet and amphetamine-dextroamphetamine (ADDERALL XR) 25 MG 24 hr capsule pharmacy said they are not to be filled until 05/27 but I picked them up last month on the 20th. I am due for my refill as of today.\"    [] Writer advised caller of callback from clinic within 24-72 hours   Unable to Assess

## 2021-11-09 NOTE — ED PROVIDER NOTE - CONSTITUTIONAL, MLM
normal... Weak appearing. Awake, alert, oriented to person, place, time/situation and in no apparent distress.

## 2021-11-09 NOTE — H&P ADULT - NSHPLABSRESULTS_GEN_ALL_CORE
.  LABS:                         12.7   9.12  )-----------( 211      ( 09 Nov 2021 15:06 )             38.6     11-09    141  |  105  |  15  ----------------------------<  123<H>  3.9   |  26  |  0.92    Ca    8.7      09 Nov 2021 15:06    TPro  6.4  /  Alb  3.7  /  TBili  0.4  /  DBili  x   /  AST  39  /  ALT  87<H>  /  AlkPhos  120  11-09    PT/INR - ( 09 Nov 2021 15:06 )   PT: 13.8 sec;   INR: 1.16          PTT - ( 09 Nov 2021 15:06 )  PTT:28.3 sec    CARDIAC MARKERS ( 09 Nov 2021 15:06 )  x     / 0.05 ng/mL / x     / x     / x          Serum Pro-Brain Natriuretic Peptide: 9840 pg/mL (11-09 @ 15:06)        RADIOLOGY, EKG & ADDITIONAL TESTS: Reviewed.

## 2021-11-09 NOTE — H&P ADULT - PROBLEM SELECTOR PLAN 1
Known hx of AS. Referred to the ED from outpatient cardiologist Dr. Parsons for dyspnea and an echo showing worsening AS.   - No baseline of EF or aortic stenosis  - Obtain collateral from Dr. Parsons in the AM  - Hold diuretics at this time given euvolemic status   - F/u TTE  - Heart failure/Dr. Saldaña consulted, f/u recs  - CT surg consulted, f/u recs Known hx of AS. Referred to the ED from outpatient cardiologist Dr. Parsons for dyspnea and an echo showing worsening AS. s/p lasix 20mg IVP.   - No baseline of EF or aortic stenosis  - Obtain collateral from Dr. Parsons in the AM   - F/u TTE  - Heart failure/Dr. Saldaña consulted, f/u recs  - CT surg consulted, f/u recs

## 2021-11-09 NOTE — PATIENT PROFILE ADULT - STATED REASON FOR ADMISSION
heart problems (Pt confused, very forgetful, states she is in a hotel, states she is very tired and wants to sleep, refusing assessment and refusing completion of patient profile, limited information obtained from patient)

## 2021-11-09 NOTE — ED PROVIDER NOTE - OBJECTIVE STATEMENT
82 y/o F w/ Hx of A-Fib on Metroprolol and no anti-coagulant use, and aortic stenosis, past visit on 11/6/21 for SOB and weakness and found to have congestive heart failure. Concern for worsening aortic stenosis at the time but signed out AMA. Pt followed up w/ Dr. Parsons (cardiology), echo was done in office and found aortic stenosis to be critical at this point so pt presents to ED for evaluation today. Pt endorses a hard time lying flat on his back and has dyspnea on exertion. No CP. No Hx of known CAD. No fever or infectious symptoms.

## 2021-11-09 NOTE — H&P ADULT - NSHPREVIEWOFSYSTEMS_GEN_ALL_CORE
REVIEW OF SYSTEMS:    CONSTITUTIONAL: No weakness, fevers or chills  EYES/ENT: No visual changes;  No vertigo or throat pain   NECK: No pain or stiffness  RESPIRATORY: No cough, wheezing, hemoptysis; (+) shortness of breath  CARDIOVASCULAR: No chest pain. (+) palpitations  GASTROINTESTINAL: No abdominal or epigastric pain. No nausea, vomiting.   NEUROLOGICAL: No numbness or weakness  SKIN: No itching, rashes

## 2021-11-09 NOTE — ED ADULT NURSE NOTE - OBJECTIVE STATEMENT
81y female c/o SOB and palpitations. pt was see in [facility] on Saturday for afib. pt on metoprolol. pt reports she had not seen a cardiologist in 2 years and saw one yesterday who told her she needs aortic valve replacement. pt received outpatient echo yesterday. pt reports feeling SOB and palpitations x 2 weeks. EKG completed. pt placed on ccm. 24 breaths per minute. Patient is awake and alert. Alert and oriented x 4. ambulates in ED w/ steady gait. pt denies fever, chills, n/v/d, headache, dizziness, numbness, tingling. 20G R forearm placed. blood work send to lab.

## 2021-11-09 NOTE — H&P ADULT - NSHPPHYSICALEXAM_GEN_ALL_CORE
.  VITAL SIGNS:  T(C): 37.2 (11-09-21 @ 15:19), Max: 37.2 (11-09-21 @ 15:19)  T(F): 98.9 (11-09-21 @ 15:19), Max: 98.9 (11-09-21 @ 15:19)  HR: 75 (11-09-21 @ 15:19) (18 - 75)  BP: 123/68 (11-09-21 @ 15:19) (107/56 - 123/68)  BP(mean): --  RR: 18 (11-09-21 @ 15:19) (18 - 20)  SpO2: 95% (11-09-21 @ 15:19) (95% - 96%)  Wt(kg): --      PHYSICAL EXAM:  Constitutional: NAD, sitting up comfortably, speaking in full sentences   Eyes: PERRL, EOMI, anicteric sclera  Neck: supple; no JVD  Respiratory: crackles b/l lower bases; no W/R/R, no retractions  Cardiac: +S1/S2; RRR; +murmur LUSB  Gastrointestinal: soft, NT/ND; +BSx4  Extremities: WWP, no clubbing or cyanosis; no peripheral edema b/l  Vascular: 2+ radial b/l, distal pulses 2+ b/l   Neurologic: AAOx3; CNII-XII grossly intact; no focal deficits  Psychiatric: affect and characteristics of appearance, verbalizations, behaviors are appropriate

## 2021-11-09 NOTE — H&P ADULT - HISTORY OF PRESENT ILLNESS
Patient is an 82 y/o F w/ Hx of A-Fib (previouisly Metroprolol, but says she has no taken it in over a year) and no anti-coagulant use, and aortic stenosis, past visit on 11/6/21 for SOB and weakness and found to have congestive heart failure. Concern for worsening aortic stenosis at the time but signed out AMA. Pt followed up w/ Dr. Parsons (cardiology), echo was done in office and found aortic stenosis to be critical at this point so pt presents to ED for evaluation today. Pt endorses a hard time lying flat on his back and has dyspnea on exertion, and occasionally feels her heart racing. Reports last year she was told she may need surgery in ~ year but felt fine. Denies hx of CAD, HTN, HLD. Otherwise denies lower extremity swelling, HA, blurry vision, cough, fever, chills, sputum production. Denies taking any medication besides vitamins.     ED course:  Vitals: 98.6F, HR 75, /68, RR 18 sat 96% on RA  Labs: Trop 0.05, BNP 9840  Imaging: CXR shows Bilateral pulmonary interstitial edema, EKG frequenct PVCs but no ischemic changes  Interventions: Heart failure and CT surg notified Patient is an 80 y/o F w/ Hx of A-Fib (previouisly Metroprolol, but says she has no taken it in over a year) and no anti-coagulant use, and aortic stenosis, past visit on 11/6/21 for SOB and weakness and found to have congestive heart failure. Concern for worsening aortic stenosis at the time but signed out AMA. Pt followed up w/ Dr. Parsons (cardiology), echo was done in office and found aortic stenosis to be critical at this point so pt presents to ED for evaluation today. Pt endorses a hard time lying flat on his back and has dyspnea on exertion, and occasionally feels her heart racing. Reports last year she was told she may need surgery in ~ year but felt fine. Denies hx of CAD, HTN, HLD. Otherwise denies lower extremity swelling, HA, blurry vision, cough, fever, chills, sputum production. Denies taking any medication besides vitamins.     Of note - no medications on file at her pharmacy besides Ropinirole 0.5mg x 10 days from 10/6/2021    ED course:  Vitals: 98.6F, HR 75, /68, RR 18 sat 96% on RA  Labs: Trop 0.05, BNP 9840  Imaging: CXR shows Bilateral pulmonary interstitial edema, EKG frequenct PVCs but no ischemic changes  Interventions: Heart failure and CT surg notified

## 2021-11-09 NOTE — ED PROVIDER NOTE - MUSCULOSKELETAL, MLM
Spine appears normal, range of motion is not limited, no muscle or joint tenderness 1+ edema to ankles only. Spine appears normal, range of motion is not limited, no muscle or joint tenderness

## 2021-11-10 DIAGNOSIS — Z29.9 ENCOUNTER FOR PROPHYLACTIC MEASURES, UNSPECIFIED: ICD-10-CM

## 2021-11-10 DIAGNOSIS — I50.9 HEART FAILURE, UNSPECIFIED: ICD-10-CM

## 2021-11-10 LAB
ALBUMIN SERPL ELPH-MCNC: 3.4 G/DL — SIGNIFICANT CHANGE UP (ref 3.3–5)
ALP SERPL-CCNC: 105 U/L — SIGNIFICANT CHANGE UP (ref 40–120)
ALT FLD-CCNC: 68 U/L — HIGH (ref 10–45)
ANION GAP SERPL CALC-SCNC: 11 MMOL/L — SIGNIFICANT CHANGE UP (ref 5–17)
APTT BLD: 32.3 SEC — SIGNIFICANT CHANGE UP (ref 27.5–35.5)
AST SERPL-CCNC: 28 U/L — SIGNIFICANT CHANGE UP (ref 10–40)
BASOPHILS # BLD AUTO: 0.05 K/UL — SIGNIFICANT CHANGE UP (ref 0–0.2)
BASOPHILS NFR BLD AUTO: 0.6 % — SIGNIFICANT CHANGE UP (ref 0–2)
BILIRUB SERPL-MCNC: 0.6 MG/DL — SIGNIFICANT CHANGE UP (ref 0.2–1.2)
BUN SERPL-MCNC: 14 MG/DL — SIGNIFICANT CHANGE UP (ref 7–23)
CALCIUM SERPL-MCNC: 8.6 MG/DL — SIGNIFICANT CHANGE UP (ref 8.4–10.5)
CHLORIDE SERPL-SCNC: 107 MMOL/L — SIGNIFICANT CHANGE UP (ref 96–108)
CHOLEST SERPL-MCNC: 128 MG/DL — SIGNIFICANT CHANGE UP
CO2 SERPL-SCNC: 25 MMOL/L — SIGNIFICANT CHANGE UP (ref 22–31)
COVID-19 NUCLEOCAPSID GAM AB INTERP: NEGATIVE — SIGNIFICANT CHANGE UP
COVID-19 NUCLEOCAPSID TOTAL GAM ANTIBODY RESULT: 0.19 INDEX — SIGNIFICANT CHANGE UP
COVID-19 SPIKE DOMAIN AB INTERP: POSITIVE
COVID-19 SPIKE DOMAIN ANTIBODY RESULT: >250 U/ML — HIGH
CREAT SERPL-MCNC: 0.9 MG/DL — SIGNIFICANT CHANGE UP (ref 0.5–1.3)
EOSINOPHIL # BLD AUTO: 0.16 K/UL — SIGNIFICANT CHANGE UP (ref 0–0.5)
EOSINOPHIL NFR BLD AUTO: 1.9 % — SIGNIFICANT CHANGE UP (ref 0–6)
GLUCOSE SERPL-MCNC: 103 MG/DL — HIGH (ref 70–99)
HCT VFR BLD CALC: 37.3 % — SIGNIFICANT CHANGE UP (ref 34.5–45)
HCT VFR BLD CALC: 39.8 % — SIGNIFICANT CHANGE UP (ref 34.5–45)
HDLC SERPL-MCNC: 42 MG/DL — LOW
HGB BLD-MCNC: 12.2 G/DL — SIGNIFICANT CHANGE UP (ref 11.5–15.5)
HGB BLD-MCNC: 13.7 G/DL — SIGNIFICANT CHANGE UP (ref 11.5–15.5)
IMM GRANULOCYTES NFR BLD AUTO: 0.5 % — SIGNIFICANT CHANGE UP (ref 0–1.5)
LIPID PNL WITH DIRECT LDL SERPL: 70 MG/DL — SIGNIFICANT CHANGE UP
LYMPHOCYTES # BLD AUTO: 1.29 K/UL — SIGNIFICANT CHANGE UP (ref 1–3.3)
LYMPHOCYTES # BLD AUTO: 15 % — SIGNIFICANT CHANGE UP (ref 13–44)
MAGNESIUM SERPL-MCNC: 2.4 MG/DL — SIGNIFICANT CHANGE UP (ref 1.6–2.6)
MCHC RBC-ENTMCNC: 30.9 PG — SIGNIFICANT CHANGE UP (ref 27–34)
MCHC RBC-ENTMCNC: 31.9 PG — SIGNIFICANT CHANGE UP (ref 27–34)
MCHC RBC-ENTMCNC: 32.7 GM/DL — SIGNIFICANT CHANGE UP (ref 32–36)
MCHC RBC-ENTMCNC: 34.4 GM/DL — SIGNIFICANT CHANGE UP (ref 32–36)
MCV RBC AUTO: 92.8 FL — SIGNIFICANT CHANGE UP (ref 80–100)
MCV RBC AUTO: 94.4 FL — SIGNIFICANT CHANGE UP (ref 80–100)
MONOCYTES # BLD AUTO: 0.74 K/UL — SIGNIFICANT CHANGE UP (ref 0–0.9)
MONOCYTES NFR BLD AUTO: 8.6 % — SIGNIFICANT CHANGE UP (ref 2–14)
NEUTROPHILS # BLD AUTO: 6.32 K/UL — SIGNIFICANT CHANGE UP (ref 1.8–7.4)
NEUTROPHILS NFR BLD AUTO: 73.4 % — SIGNIFICANT CHANGE UP (ref 43–77)
NON HDL CHOLESTEROL: 86 MG/DL — SIGNIFICANT CHANGE UP
NRBC # BLD: 0 /100 WBCS — SIGNIFICANT CHANGE UP (ref 0–0)
NRBC # BLD: 0 /100 WBCS — SIGNIFICANT CHANGE UP (ref 0–0)
PLATELET # BLD AUTO: 193 K/UL — SIGNIFICANT CHANGE UP (ref 150–400)
PLATELET # BLD AUTO: 222 K/UL — SIGNIFICANT CHANGE UP (ref 150–400)
POTASSIUM SERPL-MCNC: 3.7 MMOL/L — SIGNIFICANT CHANGE UP (ref 3.5–5.3)
POTASSIUM SERPL-SCNC: 3.7 MMOL/L — SIGNIFICANT CHANGE UP (ref 3.5–5.3)
PROT SERPL-MCNC: 6 G/DL — SIGNIFICANT CHANGE UP (ref 6–8.3)
RBC # BLD: 3.95 M/UL — SIGNIFICANT CHANGE UP (ref 3.8–5.2)
RBC # BLD: 4.29 M/UL — SIGNIFICANT CHANGE UP (ref 3.8–5.2)
RBC # FLD: 14.6 % — HIGH (ref 10.3–14.5)
RBC # FLD: 14.8 % — HIGH (ref 10.3–14.5)
SARS-COV-2 IGG+IGM SERPL QL IA: 0.19 INDEX — SIGNIFICANT CHANGE UP
SARS-COV-2 IGG+IGM SERPL QL IA: >250 U/ML — HIGH
SARS-COV-2 IGG+IGM SERPL QL IA: NEGATIVE — SIGNIFICANT CHANGE UP
SARS-COV-2 IGG+IGM SERPL QL IA: POSITIVE
SODIUM SERPL-SCNC: 143 MMOL/L — SIGNIFICANT CHANGE UP (ref 135–145)
TRIGL SERPL-MCNC: 80 MG/DL — SIGNIFICANT CHANGE UP
WBC # BLD: 10.04 K/UL — SIGNIFICANT CHANGE UP (ref 3.8–10.5)
WBC # BLD: 8.6 K/UL — SIGNIFICANT CHANGE UP (ref 3.8–10.5)
WBC # FLD AUTO: 10.04 K/UL — SIGNIFICANT CHANGE UP (ref 3.8–10.5)
WBC # FLD AUTO: 8.6 K/UL — SIGNIFICANT CHANGE UP (ref 3.8–10.5)

## 2021-11-10 PROCEDURE — 36000 PLACE NEEDLE IN VEIN: CPT

## 2021-11-10 PROCEDURE — 75573 CT HRT C+ STRUX CGEN HRT DS: CPT | Mod: 26

## 2021-11-10 PROCEDURE — 93306 TTE W/DOPPLER COMPLETE: CPT | Mod: 26

## 2021-11-10 PROCEDURE — 76937 US GUIDE VASCULAR ACCESS: CPT | Mod: 26

## 2021-11-10 PROCEDURE — 70450 CT HEAD/BRAIN W/O DYE: CPT | Mod: 26

## 2021-11-10 PROCEDURE — 74174 CTA ABD&PLVS W/CONTRAST: CPT | Mod: 26

## 2021-11-10 PROCEDURE — 75574 CT ANGIO HRT W/3D IMAGE: CPT | Mod: 26

## 2021-11-10 PROCEDURE — 99233 SBSQ HOSP IP/OBS HIGH 50: CPT | Mod: 25

## 2021-11-10 PROCEDURE — 99024 POSTOP FOLLOW-UP VISIT: CPT

## 2021-11-10 RX ORDER — METOPROLOL TARTRATE 50 MG
25 TABLET ORAL ONCE
Refills: 0 | Status: DISCONTINUED | OUTPATIENT
Start: 2021-11-10 | End: 2021-11-11

## 2021-11-10 RX ORDER — HEPARIN SODIUM 5000 [USP'U]/ML
2000 INJECTION INTRAVENOUS; SUBCUTANEOUS EVERY 6 HOURS
Refills: 0 | Status: DISCONTINUED | OUTPATIENT
Start: 2021-11-10 | End: 2021-11-11

## 2021-11-10 RX ORDER — HEPARIN SODIUM 5000 [USP'U]/ML
INJECTION INTRAVENOUS; SUBCUTANEOUS
Qty: 25000 | Refills: 0 | Status: DISCONTINUED | OUTPATIENT
Start: 2021-11-10 | End: 2021-11-11

## 2021-11-10 RX ORDER — HEPARIN SODIUM 5000 [USP'U]/ML
4500 INJECTION INTRAVENOUS; SUBCUTANEOUS EVERY 6 HOURS
Refills: 0 | Status: DISCONTINUED | OUTPATIENT
Start: 2021-11-10 | End: 2021-11-11

## 2021-11-10 RX ORDER — METOPROLOL TARTRATE 50 MG
5 TABLET ORAL ONCE
Refills: 0 | Status: COMPLETED | OUTPATIENT
Start: 2021-11-10 | End: 2021-11-10

## 2021-11-10 RX ORDER — METOPROLOL TARTRATE 50 MG
25 TABLET ORAL
Refills: 0 | Status: DISCONTINUED | OUTPATIENT
Start: 2021-11-10 | End: 2021-11-10

## 2021-11-10 RX ORDER — HEPARIN SODIUM 5000 [USP'U]/ML
4500 INJECTION INTRAVENOUS; SUBCUTANEOUS ONCE
Refills: 0 | Status: COMPLETED | OUTPATIENT
Start: 2021-11-10 | End: 2021-11-10

## 2021-11-10 RX ORDER — FUROSEMIDE 40 MG
20 TABLET ORAL ONCE
Refills: 0 | Status: COMPLETED | OUTPATIENT
Start: 2021-11-10 | End: 2021-11-10

## 2021-11-10 RX ADMIN — Medication 5 MILLIGRAM(S): at 16:33

## 2021-11-10 RX ADMIN — HEPARIN SODIUM 4500 UNIT(S): 5000 INJECTION INTRAVENOUS; SUBCUTANEOUS at 23:18

## 2021-11-10 RX ADMIN — HEPARIN SODIUM 1100 UNIT(S)/HR: 5000 INJECTION INTRAVENOUS; SUBCUTANEOUS at 16:06

## 2021-11-10 RX ADMIN — HEPARIN SODIUM 4500 UNIT(S): 5000 INJECTION INTRAVENOUS; SUBCUTANEOUS at 16:06

## 2021-11-10 RX ADMIN — Medication 20 MILLIGRAM(S): at 08:50

## 2021-11-10 RX ADMIN — HEPARIN SODIUM 1300 UNIT(S)/HR: 5000 INJECTION INTRAVENOUS; SUBCUTANEOUS at 23:17

## 2021-11-10 NOTE — PROGRESS NOTE ADULT - SUBJECTIVE AND OBJECTIVE BOX
Interventional Cardiology PA Adult Progress Note    Subjective Assessment:    Pt seen and assessed at bedside. Pt appears comfortable, is in no acute distress, and has no complaints at this time.  12 point ROS negative other than what specified.  	  MEDICATIONS:  	    [PHYSICAL EXAM:  TELEMETRY:  T(C): 36.3 (11-10-21 @ 09:16), Max: 37.2 (11-09-21 @ 15:19)  HR: 76 (11-10-21 @ 08:30) (18 - 85)  BP: 135/65 (11-10-21 @ 08:30) (107/56 - 154/65)  RR: 16 (11-10-21 @ 08:30) (16 - 20)  SpO2: 94% (11-10-21 @ 08:30) (92% - 97%)  Wt(kg): --  I&O's Summary    10 Nov 2021 07:01  -  10 Nov 2021 12:43  --------------------------------------------------------  IN: 0 mL / OUT: 0 mL / NET: 0 mL      Weight (kg): 58.1 (11-09 @ 14:21)  [place]:  Central/PICC/Mid Line:                                         Appearance: Normal	  HEENT:   Normal oral mucosa, PERRL, EOMI	  Neck: Supple, - JVD; no carotid Bruit   Cardiovascular: Normal S1 S2, No JVD, No murmurs,   Respiratory: Lungs clear to auscultation, No Rales, Rhonchi, or Wheezing	  Gastrointestinal:  Soft, Non-tender, + BS	  Skin: No rashes, No ecchymoses, No cyanosis  Extremities: Normal range of motion, No clubbing, cyanosis or edema  Vascular: Peripheral pulses palpable 2+ bilaterally  Neurologic: Non-focal  Psychiatry: A & O x 3, Mood & affect appropriate  	    ECG:  	  RADIOLOGY:   DIAGNOSTIC TESTING:  [ ] Echocardiogram:  [ ]  Catheterization:  [ ] Stress Test:    [ ] LENNOX  OTHER: 	    LABS:	 	  CARDIAC MARKERS:  Troponin T, Serum: 0.05 ng/mL (11-09-21 @ 15:06)                          12.2   8.60  )-----------( 193      ( 10 Nov 2021 07:24 )             37.3     11-10    143  |  107  |  14  ----------------------------<  103<H>  3.7   |  25  |  0.90    Ca    8.6      10 Nov 2021 07:24  Mg     2.4     11-10    TPro  6.0  /  Alb  3.4  /  TBili  0.6  /  DBili  x   /  AST  28  /  ALT  68<H>  /  AlkPhos  105  11-10    proBNP: Serum Pro-Brain Natriuretic Peptide: 9840 pg/mL (11-09 @ 15:06)    Lipid Profile:   HgA1c:   TSH:   PT/INR - ( 09 Nov 2021 15:06 )   PT: 13.8 sec;   INR: 1.16     PTT - ( 09 Nov 2021 15:06 )  PTT:28.3 sec

## 2021-11-10 NOTE — PROGRESS NOTE ADULT - ASSESSMENT
87 y/o Female with a hx of A fib and aortic stenosis presenting to the ED from her outpatient cardiologist for SOB and BRITTON in the setting of worsening aortic stenosis for intervention.

## 2021-11-10 NOTE — PROGRESS NOTE ADULT - PROBLEM SELECTOR PLAN 4
VTE ppx: Heparin ggt  Diet: DASH. NPO after midnight for possible further w/u tomorrow  Dispo: Pending clinical progression    Case d/w Dr. Dumont. VTE ppx: Heparin ggt  Diet: DASH. NPO after midnight for possible further w/u tomorrow  Dispo: Pending clinical progression  Goals of care discussion had w/ pt and daughter Gail, pt is now DNR/DNI, no feeding tubes, and Daughter Gail established as HCP. See C note for details. Pt and daughter are ammenable to DNR/DNI being waived for 30 days for possible upcoming procedure.  SW consult ordered per daughter's request to assess need for help at home, f/u recs  Consult PT once clinically appropriate given severe AS.    Case d/w Dr. Dumont.

## 2021-11-10 NOTE — PROGRESS NOTE ADULT - PROBLEM SELECTOR PLAN 3
Hx of Atrial fibrillation. Never on AC. Reports previously on metoprolol but has not taken in > 1 year. Denies palpitations at this time but says she occasionally feels her heart racing  - EKG NSR  - continue to monitor for possible rate control  - continue to hold AC at this time i/s/o possible surgical intervention      F: none  E: replete prn  N: NPO midnight for possible intervention   code: full code  Dispo: cardiac tele Hx of Afib. Reports intermittent palpitations but not on AC, takes Metoprolol w/ questionable compliance at home  - EKG initially NSR @74bpm w/ bigeminy RBBB? and frequent PVC  - Pt converted to Afib on 11/10, initially rate controlled then w/ RVR @rates 100-140s during CT scans. s/p Lopressor 5mg IVP x1, continue BB as needed for rate control and starting Lopressor 25mg BID  - AC: Full AC Heparin ggt per nomogram started  - Rate control: c/w lopressor 25mg BID

## 2021-11-10 NOTE — PROGRESS NOTE ADULT - PROBLEM SELECTOR PLAN 1
**Incomplete**    Known hx of AS. Referred to the ED from outpatient cardiologist Dr. Parsons for dyspnea and an echo showing worsening AS. s/p lasix 20mg IVP.   - No baseline of EF or aortic stenosis  - Obtain collateral from Dr. Parsons in the AM   - F/u TTE  - Heart failure/Dr. Saldaña consulted, f/u recs  - CT surg consulted, f/u recs Known hx of AS. Referred to the ED from outpatient cardiologist Dr. Parsons for dyspnea and an echo showing worsening AS.  -s/p lasix 20mg IVP x1 in the ED, additional lasix 20mg IVP x1 given this AM. No standing diuresis ordered, reassess daily as needed (preload dependent AS)  - No baseline of EF or aortic stenosis  - Obtain collateral from Dr. Parsons in the AM   - F/u TTE  - Heart failure/Dr. Saldaña consulted, f/u recs  - CT surg consulted, f/u recs Known hx of AS. Referred to the ED from outpatient cardiologist Dr. Parsons for dyspnea and an echo showing worsening AS.  - s/p lasix 20mg IVP x1 in the ED, additional lasix 20mg IVP x1 given this AM. No standing diuresis ordered, reassess daily as needed (preload dependent AS)  - Echo 11/10/21: Normal biV size and systolic function, dilated LA, severe AS (peak transvalvular velocity 4.7m/s, mean dradient 60.00mmHg, LVOT/AV velocity ratio 0.23, ROD 0.79cm^2 (estimated by continuity method), mild AR, mild-mod MR, mild TR, no pHTN, no pericardial effusion, L pleural effusion.  - Heart failure/Dr. Hernández consulted, f/u recs  -Continuous tele/pulse ox, VS per routine, ambulate w/ assistance

## 2021-11-10 NOTE — CONSULT NOTE ADULT - SUBJECTIVE AND OBJECTIVE BOX
Surgeon: Dr. Hernández    Requesting Physician: Dr. Dumont    HISTORY OF PRESENT ILLNESS:  80 y/o F w/ Hx of A-Fib (previously on metoprolol, but says she has not taken it in >1 year and no anti-coagulant use), and aortic stenosis, past visit on 11/6/21 for SOB and weakness and found to have congestive heart failure. Concern for worsening aortic stenosis at the time but signed out AMA. Pt followed up w/ Dr. Parsons (cardiology), echo was done in office and found aortic stenosis to be critical at this point so pt presents to ED for evaluation today. Pt endorses a hard time lying flat on her back and has dyspnea on exertion, fatigue, chest pain, and occasionally feels her heart racing for ~1month and increasingly worse the past 4 days. Reports last year she was told she may need surgery in ~ year but felt fine. Denies hx of CAD, HTN, HLD. Otherwise denies lower extremity swelling, HA, blurry vision, cough, fever, chills, sputum production. Denies taking any medication besides vitamins.  Structural heart disease team was consulted for her critical AS. Patient is adamant about having surgery tomorrow and not staying long in the hospital very long.     PAST MEDICAL & SURGICAL HISTORY:  Atrial fibrillation    Aortic stenosis    No significant past surgical history        MEDICATIONS  (STANDING):    MEDICATIONS  (PRN):      Allergies    No Known Allergies    Intolerances        SOCIAL HISTORY:  Smoker:  NO         ETOH use:   NO                Ilicit Drug use:   NO  Assisted device use (Cane / Walker): NONE  Live with: alone    FAMILY HISTORY:  No pertinent family history in first degree relatives    Review of Systems:  CONSTITUTIONAL: +fatigue; Denies fevers / chills, sweats, weight loss, weight gain                                       NEURO:  Denies parathesias, seizures, syncope, confusion                                                                                  EYES:  Denies blurry vision, discharge, pain, loss of vision                                                                                    ENMT:  Denies difficulty hearing, vertigo, dysphagia, epistaxis, recent dental work                                       CV: +chest pain, orthopnea, BRITTON, palpitations    RESPIRATORY:  +SOB; Denies wheezing, cough / sputum, hemoptysis                                                               GI:  Denies nausea, vomiting, diarrhea, constipation, melena                                                                          : Denies hematuria, dysuria, urgency, incontinence                                                                                          MUSKULOSKELETAL:  Denies arthritis, joint swelling, muscle weakness                                                             SKIN/BREAST:  Denies rash, itching, hair loss, masses                                                                                              PSYCH:  Denies depression, anxiety, suicidal ideation                                                                                                HEME/LYMPH:  Denies bruises easily, enlarged lymph nodes, tender lymph nodes                                          ENDOCRINE:  Denies cold intolerance, heat intolerance, polydipsia                                                                      Vital Signs Last 24 Hrs  T(C): 37.2 (09 Nov 2021 18:06), Max: 37.2 (09 Nov 2021 15:19)  T(F): 99 (09 Nov 2021 18:06), Max: 99 (09 Nov 2021 18:06)  HR: 78 (09 Nov 2021 18:06) (18 - 78)  BP: 151/73 (09 Nov 2021 18:06) (107/56 - 151/73)  BP(mean): --  RR: 18 (09 Nov 2021 18:06) (18 - 20)  SpO2: 97% (09 Nov 2021 18:06) (95% - 97%)    Physical Exam   GEN: NAD, looks comfortable, standing next to bed; speaking full sentences  Psych: Mood appropriate  Neuro: A&Ox3.  No focal deficits.  Moving all extremities.   HEENT: No obvious abnormalities  CV: S1S2, regular, +murmur appreciated.  No carotid bruits.  No JVD  Lungs: rales B/L bases.  No wheezing, or rhonchi  ABD: Soft, non-tender, non-distended.  +Bowel sounds  EXT: Warm and well perfused.  No peripheral edema noted  Musculoskeletal: Moving all extremities with normal ROM, no joint swelling  PV: Pedal pulses palpable                                                     LABS:                        12.7   9.12  )-----------( 211      ( 09 Nov 2021 15:06 )             38.6     11-09    141  |  105  |  15  ----------------------------<  123<H>  3.9   |  26  |  0.92    Ca    8.7      09 Nov 2021 15:06    TPro  6.4  /  Alb  3.7  /  TBili  0.4  /  DBili  x   /  AST  39  /  ALT  87<H>  /  AlkPhos  120  11-09    PT/INR - ( 09 Nov 2021 15:06 )   PT: 13.8 sec;   INR: 1.16          PTT - ( 09 Nov 2021 15:06 )  PTT:28.3 sec    CARDIAC MARKERS ( 09 Nov 2021 15:06 )  x     / 0.05 ng/mL / x     / x     / x          RADIOLOGY & ADDITIONAL STUDIES:  CXR:  < from: Xray Chest 1 View- PORTABLE-Urgent (Xray Chest 1 View- PORTABLE-Urgent .) (11.09.21 @ 15:05) >  HISTORY: chf?    PRIOR STUDIES: 11/7/2021.    FINDINGS: Probable cardiomegaly has before. Trace right basal pleural effusion. Bilateral pulmonary interstitial edema is unchanged. Subsegmental atelectasis right base. Calcified mediastinal nodes likely due to prior granulomatous disease.    IMPRESSION:  Bilateral pulmonary interstitial edema.    < end of copied text >      TTE / LENNOX: outpatient office: crtical AS  
Vascular Access Service Consult Note    81yFemaleHEAL ISSUES - PROBLEM Dx:  Aortic stenosis    Atrial fibrillation               Diagnosis: aortic stenosis    Indications for Vascular Access (Check all that apply)  [  ]  Antibiotic Therapy       Antibiotic Prescribed:                                                                             Expected Duration of Therapy:               [  ]  IV Hydration  [  ]  Total Parenteral Nutrition  [  ]  Chemotherapy  [ x ]  Difficult Venous Access  [  ]  CVP monitoring  [  ]  Medications with high potential for tissue necrosis on extravasation  [  ]  Other    Screening (Check all that apply)  Previous Radiation to chest  [  ] Yes      [ x ]  No  Breast Cancer                          [  ] Left     [  ]  Right    [ x ]  No  Lymph Node Dissection         [  ] Left     [  ]  Right    [ x ]  No  Pacemaker or ICD                   [  ] Left     [  ]  Right    [x  ]  No  Upper Extremity DVT             [  ] Left     [  ]  Right    [x ]  No  Chronic Kidney Disease         [  ]  Yes     [x  ]  No  Hemodialysis                           [  ]  Yes     [  x]  No  AV Fistula/ Graft                     [  ]  Left    [  ]  Right    [x  ]  No  Temp>101F in past 24 H       [  ]  Yes     [x  ]  No  H/O PICC/Midline                   [  ]  Yes     [x  ]  No    Lab data:                        12.2   8.60  )-----------( 193      ( 10 Nov 2021 07:24 )             37.3     11-10    143  |  107  |  14  ----------------------------<  103<H>  3.7   |  25  |  0.90    Ca    8.6      10 Nov 2021 07:24  Mg     2.4     11-10    TPro  6.0  /  Alb  3.4  /  TBili  0.6  /  DBili  x   /  AST  28  /  ALT  68<H>  /  AlkPhos  105  11-10    PT/INR - ( 09 Nov 2021 15:06 )   PT: 13.8 sec;   INR: 1.16          PTT - ( 09 Nov 2021 15:06 )  PTT:28.3 sec          I have reviewed the chart, interviewed and examined the patient and determined that this patient:  [  ] Is a candidate for a PICC line  [x  ] Is a candidate for a Midline  [  ] Is not a candidate for vascular access device (reason)    Lumens:    [  ] Single  [x  ] Double

## 2021-11-10 NOTE — PROCEDURE NOTE - NSICDXPROCEDURE_GEN_ALL_CORE_FT
PROCEDURES:  Insertion, needle, vein 10-Nov-2021 13:20:26  Juanita Saldaña  Ultrasound guided venous access 10-Nov-2021 13:20:36  Juanita Saldaña

## 2021-11-10 NOTE — PROGRESS NOTE ADULT - ATTENDING COMMENTS
Patient seen and examined. Case discussed with ACP.     85yo W with hx of afib (not on a/c) who had recently presented to ER with pulm edema and left AMA, now sent in from her cardiologist for AS eval. Here being evaluated by structural for TAVR.   Denies any complaints -- hopes to have procedure and go home soon. On exam with decreased breath sounds over lungs, no LE edema.   Has large right and moderate left pleural effusions on imaging.   With episodes of afib with RVR.     Anticoagulation with hep gtt if pt allows.   Rate control with metoprolol if pt allows.   Diuresis as pt allows.   Pt is in favor for TAVR--being discussed with structural team.   Emphasized importance of adherence to a/c.     Yesenia Dumont MD

## 2021-11-10 NOTE — PROGRESS NOTE ADULT - PROBLEM SELECTOR PLAN 2
Hx of Atrial fibrillation. Never on AC. Reports previously on metoprolol but has not taken in > 1 year. Denies palpitations at this time but says she occasionally feels her heart racing  - EKG NSR  - continue to monitor for possible rate control  - continue to hold AC at this time i/s/o possible surgical intervention      F: none  E: replete prn  N: NPO midnight for possible intervention   code: full code  Dispo: cardiac tele *Incomplete**    Presented w/ SOB/BRITTON xfew days. Bibasilar rales, no peripheral edema, no JVD. Denies CHF Hx  -s/p lasix 20mg IVP x1 in the ED, additional lasix 20mg IVP x1 given this AM. No standing diuresis ordered, reassess daily as needed (preload dependent AS) Presented w/ SOB/BRITTON xfew days. Bibasilar rales, no peripheral edema, no JVD. Denies CHF Hx. Lasix naive.  -CXR: b/l interstitial pleural edema  -Neg net 250cc documented this admission but refusing PrimaFit in favor of urine hat  -s/p lasix 20mg IVP x1 in the ED, additional lasix 20mg IVP x1 given this AM and additional Lasix 20mg IVP x1 ordered for tonight. Reassess diuresis as needed daily (pre-load dependent AS)  -Core measures, strict I/O's w/ Primafit preferred (pt refusing), daily weights

## 2021-11-10 NOTE — GOALS OF CARE CONVERSATION - ADVANCED CARE PLANNING - CONVERSATION DETAILS
Goals of care discussion performed for approximately 20 minutes at bedside w/ patient and newly appointed HCP daughter Gail. After discussion about pt's diagnoses, pt demonstrated relatively poor understanding of her the severity of her condition and necessity of her aortic valve being further assessed, instead fixating on what will lead her to being discharged earliest. Pt was explained details of CPR and other resuscitative efforts and demonstrated good understanding of this. Pt and daughter in agreement that they wish for pt to be made DNR/DNI, with no feeding tubes. MOLST form filled out and placed in chart. DNR/DNI order placed and RN notified.

## 2021-11-11 ENCOUNTER — APPOINTMENT (OUTPATIENT)
Dept: CARDIOTHORACIC SURGERY | Facility: HOSPITAL | Age: 81
End: 2021-11-11

## 2021-11-11 PROBLEM — I35.0 NONRHEUMATIC AORTIC (VALVE) STENOSIS: Chronic | Status: ACTIVE | Noted: 2021-11-09

## 2021-11-11 PROBLEM — I48.91 UNSPECIFIED ATRIAL FIBRILLATION: Chronic | Status: ACTIVE | Noted: 2021-11-09

## 2021-11-11 LAB
ALBUMIN SERPL ELPH-MCNC: 3.2 G/DL — LOW (ref 3.3–5)
ALBUMIN SERPL ELPH-MCNC: 3.7 G/DL — SIGNIFICANT CHANGE UP (ref 3.3–5)
ALP SERPL-CCNC: 92 U/L — SIGNIFICANT CHANGE UP (ref 40–120)
ALP SERPL-CCNC: 92 U/L — SIGNIFICANT CHANGE UP (ref 40–120)
ALT FLD-CCNC: 44 U/L — SIGNIFICANT CHANGE UP (ref 10–45)
ALT FLD-CCNC: 45 U/L — SIGNIFICANT CHANGE UP (ref 10–45)
ANION GAP SERPL CALC-SCNC: 10 MMOL/L — SIGNIFICANT CHANGE UP (ref 5–17)
ANION GAP SERPL CALC-SCNC: 11 MMOL/L — SIGNIFICANT CHANGE UP (ref 5–17)
APTT BLD: 27.3 SEC — LOW (ref 27.5–35.5)
APTT BLD: 30.9 SEC — SIGNIFICANT CHANGE UP (ref 27.5–35.5)
AST SERPL-CCNC: 19 U/L — SIGNIFICANT CHANGE UP (ref 10–40)
AST SERPL-CCNC: 20 U/L — SIGNIFICANT CHANGE UP (ref 10–40)
BASOPHILS # BLD AUTO: 0.04 K/UL — SIGNIFICANT CHANGE UP (ref 0–0.2)
BASOPHILS NFR BLD AUTO: 0.3 % — SIGNIFICANT CHANGE UP (ref 0–2)
BILIRUB SERPL-MCNC: 0.4 MG/DL — SIGNIFICANT CHANGE UP (ref 0.2–1.2)
BILIRUB SERPL-MCNC: 0.6 MG/DL — SIGNIFICANT CHANGE UP (ref 0.2–1.2)
BLD GP AB SCN SERPL QL: NEGATIVE — SIGNIFICANT CHANGE UP
BUN SERPL-MCNC: 13 MG/DL — SIGNIFICANT CHANGE UP (ref 7–23)
BUN SERPL-MCNC: 15 MG/DL — SIGNIFICANT CHANGE UP (ref 7–23)
CALCIUM SERPL-MCNC: 8.4 MG/DL — SIGNIFICANT CHANGE UP (ref 8.4–10.5)
CALCIUM SERPL-MCNC: 9.3 MG/DL — SIGNIFICANT CHANGE UP (ref 8.4–10.5)
CHLORIDE SERPL-SCNC: 102 MMOL/L — SIGNIFICANT CHANGE UP (ref 96–108)
CHLORIDE SERPL-SCNC: 105 MMOL/L — SIGNIFICANT CHANGE UP (ref 96–108)
CO2 SERPL-SCNC: 26 MMOL/L — SIGNIFICANT CHANGE UP (ref 22–31)
CO2 SERPL-SCNC: 27 MMOL/L — SIGNIFICANT CHANGE UP (ref 22–31)
CREAT SERPL-MCNC: 0.88 MG/DL — SIGNIFICANT CHANGE UP (ref 0.5–1.3)
CREAT SERPL-MCNC: 0.91 MG/DL — SIGNIFICANT CHANGE UP (ref 0.5–1.3)
EOSINOPHIL # BLD AUTO: 0.2 K/UL — SIGNIFICANT CHANGE UP (ref 0–0.5)
EOSINOPHIL NFR BLD AUTO: 1.6 % — SIGNIFICANT CHANGE UP (ref 0–6)
GAS PNL BLDA: SIGNIFICANT CHANGE UP
GAS PNL BLDA: SIGNIFICANT CHANGE UP
GLUCOSE BLDC GLUCOMTR-MCNC: 115 MG/DL — HIGH (ref 70–99)
GLUCOSE BLDC GLUCOMTR-MCNC: 153 MG/DL — HIGH (ref 70–99)
GLUCOSE SERPL-MCNC: 101 MG/DL — HIGH (ref 70–99)
GLUCOSE SERPL-MCNC: 141 MG/DL — HIGH (ref 70–99)
HCT VFR BLD CALC: 33.9 % — LOW (ref 34.5–45)
HCT VFR BLD CALC: 35.5 % — SIGNIFICANT CHANGE UP (ref 34.5–45)
HGB BLD-MCNC: 11.2 G/DL — LOW (ref 11.5–15.5)
HGB BLD-MCNC: 11.9 G/DL — SIGNIFICANT CHANGE UP (ref 11.5–15.5)
IMM GRANULOCYTES NFR BLD AUTO: 0.9 % — SIGNIFICANT CHANGE UP (ref 0–1.5)
INR BLD: 1.17 — HIGH (ref 0.88–1.16)
INR BLD: 1.26 — HIGH (ref 0.88–1.16)
LYMPHOCYTES # BLD AUTO: 1.34 K/UL — SIGNIFICANT CHANGE UP (ref 1–3.3)
LYMPHOCYTES # BLD AUTO: 10.8 % — LOW (ref 13–44)
MAGNESIUM SERPL-MCNC: 2.1 MG/DL — SIGNIFICANT CHANGE UP (ref 1.6–2.6)
MCHC RBC-ENTMCNC: 30.9 PG — SIGNIFICANT CHANGE UP (ref 27–34)
MCHC RBC-ENTMCNC: 31.4 PG — SIGNIFICANT CHANGE UP (ref 27–34)
MCHC RBC-ENTMCNC: 33 GM/DL — SIGNIFICANT CHANGE UP (ref 32–36)
MCHC RBC-ENTMCNC: 33.5 GM/DL — SIGNIFICANT CHANGE UP (ref 32–36)
MCV RBC AUTO: 93.4 FL — SIGNIFICANT CHANGE UP (ref 80–100)
MCV RBC AUTO: 93.7 FL — SIGNIFICANT CHANGE UP (ref 80–100)
MONOCYTES # BLD AUTO: 0.68 K/UL — SIGNIFICANT CHANGE UP (ref 0–0.9)
MONOCYTES NFR BLD AUTO: 5.5 % — SIGNIFICANT CHANGE UP (ref 2–14)
NEUTROPHILS # BLD AUTO: 10.02 K/UL — HIGH (ref 1.8–7.4)
NEUTROPHILS NFR BLD AUTO: 80.9 % — HIGH (ref 43–77)
NRBC # BLD: 0 /100 WBCS — SIGNIFICANT CHANGE UP (ref 0–0)
NRBC # BLD: 0 /100 WBCS — SIGNIFICANT CHANGE UP (ref 0–0)
PHOSPHATE SERPL-MCNC: 4 MG/DL — SIGNIFICANT CHANGE UP (ref 2.5–4.5)
PLATELET # BLD AUTO: 186 K/UL — SIGNIFICANT CHANGE UP (ref 150–400)
PLATELET # BLD AUTO: 230 K/UL — SIGNIFICANT CHANGE UP (ref 150–400)
POTASSIUM SERPL-MCNC: 3.1 MMOL/L — LOW (ref 3.5–5.3)
POTASSIUM SERPL-MCNC: 4 MMOL/L — SIGNIFICANT CHANGE UP (ref 3.5–5.3)
POTASSIUM SERPL-SCNC: 3.1 MMOL/L — LOW (ref 3.5–5.3)
POTASSIUM SERPL-SCNC: 4 MMOL/L — SIGNIFICANT CHANGE UP (ref 3.5–5.3)
PROT SERPL-MCNC: 5.7 G/DL — LOW (ref 6–8.3)
PROT SERPL-MCNC: 6.4 G/DL — SIGNIFICANT CHANGE UP (ref 6–8.3)
PROTHROM AB SERPL-ACNC: 13.9 SEC — HIGH (ref 10.6–13.6)
PROTHROM AB SERPL-ACNC: 14.9 SEC — HIGH (ref 10.6–13.6)
RBC # BLD: 3.63 M/UL — LOW (ref 3.8–5.2)
RBC # BLD: 3.79 M/UL — LOW (ref 3.8–5.2)
RBC # FLD: 14.5 % — SIGNIFICANT CHANGE UP (ref 10.3–14.5)
RBC # FLD: 14.6 % — HIGH (ref 10.3–14.5)
RH IG SCN BLD-IMP: POSITIVE — SIGNIFICANT CHANGE UP
SODIUM SERPL-SCNC: 139 MMOL/L — SIGNIFICANT CHANGE UP (ref 135–145)
SODIUM SERPL-SCNC: 142 MMOL/L — SIGNIFICANT CHANGE UP (ref 135–145)
WBC # BLD: 11.33 K/UL — HIGH (ref 3.8–10.5)
WBC # BLD: 12.39 K/UL — HIGH (ref 3.8–10.5)
WBC # FLD AUTO: 11.33 K/UL — HIGH (ref 3.8–10.5)
WBC # FLD AUTO: 12.39 K/UL — HIGH (ref 3.8–10.5)

## 2021-11-11 PROCEDURE — 92986 REVISION OF AORTIC VALVE: CPT

## 2021-11-11 PROCEDURE — 93452 LEFT HRT CATH W/VENTRCLGRPHY: CPT | Mod: 26

## 2021-11-11 PROCEDURE — 93880 EXTRACRANIAL BILAT STUDY: CPT | Mod: 26

## 2021-11-11 PROCEDURE — 71045 X-RAY EXAM CHEST 1 VIEW: CPT | Mod: 26

## 2021-11-11 PROCEDURE — 99233 SBSQ HOSP IP/OBS HIGH 50: CPT

## 2021-11-11 PROCEDURE — 76937 US GUIDE VASCULAR ACCESS: CPT | Mod: 26

## 2021-11-11 PROCEDURE — 92986 REVISION OF AORTIC VALVE: CPT | Mod: AS

## 2021-11-11 RX ORDER — DEXTROSE 50 % IN WATER 50 %
15 SYRINGE (ML) INTRAVENOUS ONCE
Refills: 0 | Status: DISCONTINUED | OUTPATIENT
Start: 2021-11-11 | End: 2021-11-12

## 2021-11-11 RX ORDER — DEXTROSE 50 % IN WATER 50 %
12.5 SYRINGE (ML) INTRAVENOUS ONCE
Refills: 0 | Status: DISCONTINUED | OUTPATIENT
Start: 2021-11-11 | End: 2021-11-12

## 2021-11-11 RX ORDER — FUROSEMIDE 40 MG
20 TABLET ORAL ONCE
Refills: 0 | Status: COMPLETED | OUTPATIENT
Start: 2021-11-11 | End: 2021-11-11

## 2021-11-11 RX ORDER — CALCIUM GLUCONATE 100 MG/ML
2 VIAL (ML) INTRAVENOUS ONCE
Refills: 0 | Status: COMPLETED | OUTPATIENT
Start: 2021-11-11 | End: 2021-11-11

## 2021-11-11 RX ORDER — ATORVASTATIN CALCIUM 80 MG/1
40 TABLET, FILM COATED ORAL AT BEDTIME
Refills: 0 | Status: DISCONTINUED | OUTPATIENT
Start: 2021-11-11 | End: 2021-11-12

## 2021-11-11 RX ORDER — ACETAMINOPHEN 500 MG
1000 TABLET ORAL ONCE
Refills: 0 | Status: DISCONTINUED | OUTPATIENT
Start: 2021-11-11 | End: 2021-11-12

## 2021-11-11 RX ORDER — PHENYLEPHRINE HYDROCHLORIDE 10 MG/ML
0.2 INJECTION INTRAVENOUS
Qty: 40 | Refills: 0 | Status: DISCONTINUED | OUTPATIENT
Start: 2021-11-11 | End: 2021-11-12

## 2021-11-11 RX ORDER — SODIUM CHLORIDE 9 MG/ML
1000 INJECTION INTRAMUSCULAR; INTRAVENOUS; SUBCUTANEOUS
Refills: 0 | Status: DISCONTINUED | OUTPATIENT
Start: 2021-11-11 | End: 2021-11-12

## 2021-11-11 RX ORDER — DEXTROSE 50 % IN WATER 50 %
25 SYRINGE (ML) INTRAVENOUS ONCE
Refills: 0 | Status: DISCONTINUED | OUTPATIENT
Start: 2021-11-11 | End: 2021-11-12

## 2021-11-11 RX ORDER — POTASSIUM CHLORIDE 20 MEQ
20 PACKET (EA) ORAL
Refills: 0 | Status: COMPLETED | OUTPATIENT
Start: 2021-11-11 | End: 2021-11-11

## 2021-11-11 RX ORDER — SODIUM CHLORIDE 9 MG/ML
1000 INJECTION, SOLUTION INTRAVENOUS
Refills: 0 | Status: DISCONTINUED | OUTPATIENT
Start: 2021-11-11 | End: 2021-11-12

## 2021-11-11 RX ORDER — PANTOPRAZOLE SODIUM 20 MG/1
40 TABLET, DELAYED RELEASE ORAL DAILY
Refills: 0 | Status: DISCONTINUED | OUTPATIENT
Start: 2021-11-11 | End: 2021-11-12

## 2021-11-11 RX ORDER — DEXMEDETOMIDINE HYDROCHLORIDE IN 0.9% SODIUM CHLORIDE 4 UG/ML
0.2 INJECTION INTRAVENOUS
Qty: 400 | Refills: 0 | Status: DISCONTINUED | OUTPATIENT
Start: 2021-11-11 | End: 2021-11-12

## 2021-11-11 RX ORDER — CEFAZOLIN SODIUM 1 G
2000 VIAL (EA) INJECTION EVERY 8 HOURS
Refills: 0 | Status: COMPLETED | OUTPATIENT
Start: 2021-11-11 | End: 2021-11-12

## 2021-11-11 RX ORDER — GLUCAGON INJECTION, SOLUTION 0.5 MG/.1ML
1 INJECTION, SOLUTION SUBCUTANEOUS ONCE
Refills: 0 | Status: DISCONTINUED | OUTPATIENT
Start: 2021-11-11 | End: 2021-11-12

## 2021-11-11 RX ORDER — ASPIRIN/CALCIUM CARB/MAGNESIUM 324 MG
325 TABLET ORAL ONCE
Refills: 0 | Status: COMPLETED | OUTPATIENT
Start: 2021-11-11 | End: 2021-11-11

## 2021-11-11 RX ORDER — ASPIRIN/CALCIUM CARB/MAGNESIUM 324 MG
81 TABLET ORAL DAILY
Refills: 0 | Status: DISCONTINUED | OUTPATIENT
Start: 2021-11-12 | End: 2021-11-12

## 2021-11-11 RX ORDER — HEPARIN SODIUM 5000 [USP'U]/ML
5000 INJECTION INTRAVENOUS; SUBCUTANEOUS EVERY 8 HOURS
Refills: 0 | Status: DISCONTINUED | OUTPATIENT
Start: 2021-11-11 | End: 2021-11-11

## 2021-11-11 RX ORDER — ALBUMIN HUMAN 25 %
50 VIAL (ML) INTRAVENOUS ONCE
Refills: 0 | Status: COMPLETED | OUTPATIENT
Start: 2021-11-11 | End: 2021-11-11

## 2021-11-11 RX ORDER — POLYETHYLENE GLYCOL 3350 17 G/17G
17 POWDER, FOR SOLUTION ORAL DAILY
Refills: 0 | Status: DISCONTINUED | OUTPATIENT
Start: 2021-11-11 | End: 2021-11-12

## 2021-11-11 RX ORDER — SENNA PLUS 8.6 MG/1
2 TABLET ORAL AT BEDTIME
Refills: 0 | Status: DISCONTINUED | OUTPATIENT
Start: 2021-11-11 | End: 2021-11-12

## 2021-11-11 RX ORDER — HEPARIN SODIUM 5000 [USP'U]/ML
650 INJECTION INTRAVENOUS; SUBCUTANEOUS
Qty: 25000 | Refills: 0 | Status: DISCONTINUED | OUTPATIENT
Start: 2021-11-11 | End: 2021-11-12

## 2021-11-11 RX ORDER — CLOPIDOGREL BISULFATE 75 MG/1
300 TABLET, FILM COATED ORAL ONCE
Refills: 0 | Status: COMPLETED | OUTPATIENT
Start: 2021-11-11 | End: 2021-11-11

## 2021-11-11 RX ORDER — INSULIN LISPRO 100/ML
VIAL (ML) SUBCUTANEOUS
Refills: 0 | Status: DISCONTINUED | OUTPATIENT
Start: 2021-11-11 | End: 2021-11-12

## 2021-11-11 RX ORDER — LANOLIN ALCOHOL/MO/W.PET/CERES
5 CREAM (GRAM) TOPICAL AT BEDTIME
Refills: 0 | Status: DISCONTINUED | OUTPATIENT
Start: 2021-11-11 | End: 2021-11-12

## 2021-11-11 RX ADMIN — SENNA PLUS 2 TABLET(S): 8.6 TABLET ORAL at 21:04

## 2021-11-11 RX ADMIN — Medication 5 MILLIGRAM(S): at 23:26

## 2021-11-11 RX ADMIN — HEPARIN SODIUM 6.5 UNIT(S)/HR: 5000 INJECTION INTRAVENOUS; SUBCUTANEOUS at 20:51

## 2021-11-11 RX ADMIN — CLOPIDOGREL BISULFATE 300 MILLIGRAM(S): 75 TABLET, FILM COATED ORAL at 11:28

## 2021-11-11 RX ADMIN — Medication 100 MILLIEQUIVALENT(S): at 18:29

## 2021-11-11 RX ADMIN — Medication 200 GRAM(S): at 15:19

## 2021-11-11 RX ADMIN — Medication 325 MILLIGRAM(S): at 11:38

## 2021-11-11 RX ADMIN — Medication 2: at 21:43

## 2021-11-11 RX ADMIN — Medication 50 MILLILITER(S): at 18:58

## 2021-11-11 RX ADMIN — Medication 100 MILLIEQUIVALENT(S): at 16:54

## 2021-11-11 RX ADMIN — Medication 20 MILLIGRAM(S): at 17:14

## 2021-11-11 RX ADMIN — Medication 100 MILLIGRAM(S): at 21:04

## 2021-11-11 RX ADMIN — PHENYLEPHRINE HYDROCHLORIDE 4.36 MICROGRAM(S)/KG/MIN: 10 INJECTION INTRAVENOUS at 18:59

## 2021-11-11 RX ADMIN — ATORVASTATIN CALCIUM 40 MILLIGRAM(S): 80 TABLET, FILM COATED ORAL at 21:04

## 2021-11-11 NOTE — DIETITIAN INITIAL EVALUATION ADULT. - OTHER CALCULATIONS
Defer fluids to team. Based on Standards of Care pt within % IBW thus actual body weight used for all calculations. Needs adjusted for advanced age and post op healing.

## 2021-11-11 NOTE — PROGRESS NOTE ADULT - PROBLEM SELECTOR PLAN 1
Known hx of AS. Referred to the ED from outpatient cardiologist Dr. Parsons for dyspnea and an echo showing worsening AS.  - s/p lasix 20mg IVP x1 in the ED, additional lasix 20mg IVP x1 given this AM. No standing diuresis ordered, reassess daily as needed (preload dependent AS)  - Echo 11/10/21: Normal biV size and systolic function, dilated LA, severe AS (peak transvalvular velocity 4.7m/s, mean dradient 60.00mmHg, LVOT/AV velocity ratio 0.23, ROD 0.79cm^2 (estimated by continuity method), mild AR, mild-mod MR, mild TR, no pHTN, no pericardial effusion, L pleural effusion.  - Heart failure/Dr. Hernández consulted, f/u recs  -Continuous tele/pulse ox, VS per routine, ambulate w/ assistance Known hx of AS. Referred to the ED from outpatient cardiologist Dr. Parsons for dyspnea and ECHO showing worsening AS.  - s/p lasix 20mg IVP x1 in the ED, additional lasix 20mg IVP x1 given this AM. No standing diuresis ordered, reassess daily as needed (preload dependent AS)  - Echo 11/10/21: Normal BiV size and systolic function, dilated LA, severe AS (peak transvalvular velocity 4.7m/s, mean gradient 60.00mmHg, LVOT/AV velocity ratio 0.23, ROD 0.79cm^2 (estimated by continuity method), mild AR, mild-mod MR, mild TR, no pHTN, no pericardial effusion, L pleural effusion.  - Plan for BAV and LHC with Dr. Hernández today 11/11  - Continuous tele/pulse ox, VS per routine, ambulate w/ assistance Known hx of AS. Referred to the ED from outpatient cardiologist Dr. Parsons for dyspnea and ECHO showing worsening AS.  - s/p lasix 20mg IVP x1 in the ED, additional lasix 20mg IVP x1 given this AM. No standing diuresis ordered, reassess daily as needed (preload dependent AS)  - Echo 11/10/21: Normal BiV size and systolic function, dilated LA, severe AS (peak transvalvular velocity 4.7m/s, mean gradient 60.00mmHg, LVOT/AV velocity ratio 0.23, ROD 0.79cm^2 (estimated by continuity method), mild AR, mild-mod MR, mild TR, no pHTN, no pericardial effusion, L pleural effusion.  - CTA heart congenital 11/10: Motion artifact with poor contrast opacification precludes accurate luminal assessment of multiple segments. Mid RCA not visualized, probably occluded, LM is probably non obstructive, Calcified and thickened trileaflet aortic valve with reduced cusp excursion, Aortic and mitral annular calcification noted, AIDEE thrombus.  - CTA abd/pelvis 11/10: Aortic and arterial measurements as above. Severe aortic valvular calcification and mitral annular calcification, B/L mod to large pleural effusions. B/L pulmonary edema, Occlusion of proximal superior mesenteric artery, reconstituted distally by collaterals.  - CT head 11/10: No acute intracranial pathology. Parenchymal volume loss with mild small vessel ischemic change as above  - S/p Plavix 300mg PO x 1   - Plan for BAV and LHC with Dr. Hernández today 11/11  - Continuous tele/pulse ox, VS per routine, ambulate w/ assistance

## 2021-11-11 NOTE — PROGRESS NOTE ADULT - PROBLEM SELECTOR PLAN 2
Presented w/ SOB/BRITTON xfew days. Bibasilar rales, no peripheral edema, no JVD. Denies CHF Hx. Lasix naive.  -CXR: b/l interstitial pleural edema  -Neg net 250cc documented this admission but refusing PrimaFit in favor of urine hat  -s/p lasix 20mg IVP x1 in the ED, additional lasix 20mg IVP x1 given this AM and additional Lasix 20mg IVP x1 ordered for tonight. Reassess diuresis as needed daily (pre-load dependent AS)  -Core measures, strict I/O's w/ Primafit preferred (pt refusing), daily weights Presented w/ SOB/BRITTON xfew days. Bibasilar rales, no peripheral edema, no JVD. Denies CHF Hx. Lasix naive.  -CXR: b/l interstitial pleural edema  - Inaccurate I's and O's as pt noncompliant with Primafit and hat   -s/p lasix 20mg IVP x1 in the ED, additional lasix 20mg IVP x1 given this AM and additional Lasix 20mg IVP x1 ordered for tonight. Reassess diuresis as needed daily (pre-load dependent AS)  -Core measures, strict I/O's, daily weights Presented w/ SOB/BRITTON x few days. Bibasilar rales, no peripheral edema, no JVD. Denies CHF Hx. Lasix naive.  -CXR: b/l interstitial pleural edema  - Inaccurate I's and O's as pt noncompliant with Primafit and hat   - s/p Lasix 20mg IVP x1 in the ED, additional Lasix 20mg IVP x1 given this AM and additional Lasix 20mg IVP x1 ordered for tonight. Reassess diuresis as needed daily (pre-load dependent AS)  - Plan for drainage of pleural effusion today 11/11  -Core measures, strict I/O's, daily weights

## 2021-11-11 NOTE — DIETITIAN INITIAL EVALUATION ADULT. - PROBLEM SELECTOR PLAN 2
Hx of Atrial fibrillation. Never on AC. Reports previously on metoprolol but has not taken in > 1 year. Denies palpitations at this time but says she occasionally feels her heart racing  - EKG NSR  - continue to monitor for possible rate control  - continue to hold AC at this time i/s/o possible surgical intervention      F: none  E: replete prn  N: NPO midnight for possible intervention   code: full code  Dispo: cardiac tele

## 2021-11-11 NOTE — DIETITIAN INITIAL EVALUATION ADULT. - PROBLEM SELECTOR PLAN 1
Known hx of AS. Referred to the ED from outpatient cardiologist Dr. Parsons for dyspnea and an echo showing worsening AS. s/p lasix 20mg IVP.   - No baseline of EF or aortic stenosis  - Obtain collateral from Dr. Parsons in the AM   - F/u TTE  - Heart failure/Dr. Saldaña consulted, f/u recs  - CT surg consulted, f/u recs

## 2021-11-11 NOTE — PROGRESS NOTE ADULT - PROBLEM SELECTOR PLAN 3
Hx of Afib. Reports intermittent palpitations but not on AC, takes Metoprolol w/ questionable compliance at home  - EKG initially NSR @74bpm w/ bigeminy RBBB? and frequent PVC  - Pt converted to Afib on 11/10, initially rate controlled then w/ RVR @rates 100-140s during CT scans. s/p Lopressor 5mg IVP x1, continue BB as needed for rate control and starting Lopressor 25mg BID  - AC: Full AC Heparin ggt per nomogram started  - Rate control: c/w lopressor 25mg BID

## 2021-11-11 NOTE — DIETITIAN INITIAL EVALUATION ADULT. - OTHER INFO
85 y/o Female with a hx of A fib and aortic stenosis presenting to the ED from her outpatient cardiologist for SOB and BRITTON in the setting of worsening aortic stenosis for intervention. Now s/p aortic balloon valvuloplasty and catheterization w/o bypass grafts angiogram. Of note, pt noted as disoriented in EMR. GOC conversation 11/10; pt and daughter in agreement for DNR/DNI w/ no feeding tubes.       RD attempted to see pt at bedside, however, pt off floor in OR thus information obtained via EMR at this time. Noted w/ nausea/vomiting 11/10. No bowel movement since admission. NKFA documented. Unable to assess PO intake PTA. Dosing weight 128 pounds. No weight history in EMR. No edema documented at this time. No pressure ulcers documented at this time. Anderson score=21. Labs reviewed 11/10; electrolytes WNL. Unable to perform nutrition focused physical exam at this time. Based on ASPEN guidelines, pt does not meet criteria for malnutrition at this time, will monitor risk for malnutrition. Diet education deferred at this time. No cultural, ethnic, [employer] food preferences noted in EMR. RD to follow up per protocol. See nutrition recommendations below.

## 2021-11-11 NOTE — DIETITIAN INITIAL EVALUATION ADULT. - PERTINENT LABORATORY DATA
11/10  Sodium, Serum: 143 mmol/L  Potassium, Serum: 3.7 mmol/L  Chloride, Serum: 107 mmol/L  BUN, Serum: 14 mg/dL  Creatinine, Serum: 0.90 mg/dL  Glucose, Serum: 103 mg/dL (Elevated)  Calcium, Serum: 8.6 mg/dL  Magnesium, Serum: 2.4 mg/dL

## 2021-11-11 NOTE — BRIEF OPERATIVE NOTE - NSICDXBRIEFPROCEDURE_GEN_ALL_CORE_FT
PROCEDURES:  Balloon valvuloplasty, aortic 11-Nov-2021 13:50:55  Ria Aguilera  Catheterization, heart, left, without bypass grafts angiogram 11-Nov-2021 13:51:08  Ria Aguilera

## 2021-11-11 NOTE — PROGRESS NOTE ADULT - ATTENDING COMMENTS
Patient seen and examined. Case discussed with ACP.     87yo W with hx of afib (not on a/c) who had recently presented to ER with pulm edema and left AMA, now sent in from her cardiologist for AS eval. Here being evaluated by structural for TAVR.   Denies any complaints -- hopes to have procedure and go home soon. On exam with decreased breath sounds over lungs, no LE edema.   Has large right and moderate left pleural effusions on imaging.   With episodes of afib with RVR.   Imaging with + AIDEE thrombus  Today refusing IV placement, refusing telemetry, refusing labs.   Says ok with valve procedure and ok with taking a/c post procedure.     Plan for LHC + BAV with Dr. Hernández today.   Pt agreeable with tele on floor. IV to be attempted in OR.   Pt ok with anticoag post procedure -- emphasized importance.   To have drainage of right pleural effusion today as well.   Rates controlled today on metoprolol.  Ideally would also be on aspirin, statin but pt hesitant to medications in general. Based on results of procedure today, will have to discuss with pt and daughter re: med regimen     Yesenia Dumont MD Patient seen and examined. Case discussed with ACP.     87yo W with hx of afib (not on a/c) who had recently presented to ER with pulm edema and left AMA, now sent in from her cardiologist for AS eval. Here being evaluated by structural for TAVR.   TTE here with severe AS, normal LV and RV function, mild-mod MR, mild TR  Denies any complaints -- hopes to have procedure and go home soon. On exam with decreased breath sounds over lungs, no LE edema.   Has large right and moderate left pleural effusions on imaging.   With episodes of afib with RVR.   Imaging with + AIDEE thrombus  Today refusing IV placement, refusing telemetry, refusing labs.   Says ok with valve procedure and ok with taking a/c post procedure.     Plan for LHC + BAV with Dr. Hernández today.   Pt agreeable with tele on floor. IV to be attempted in OR.   Pt ok with anticoag post procedure -- emphasized importance.   To have drainage of right pleural effusion today as well.   Rates controlled today on metoprolol.  Ideally would also be on aspirin, statin but pt hesitant to medications in general. Based on results of procedure today, will have to discuss with pt and daughter re: med regimen     Yesenia Dumont MD

## 2021-11-11 NOTE — PROGRESS NOTE ADULT - ASSESSMENT
A/P: 81 year old female with PMHx of Afib (not on AC), Aortic stenosis presented to [facility] ED for acute on chronic CHF exacerbation. Structural heart consulted for known severe aortic stenosis. Patient now s/p BAV with Dr. Hernández. Procedure significant for LBBB intraop and patient transferred to  as mini ICU with R groin TVP.     Neurovascular: Stable.. Pain well controlled with current regimen.  - Continue tylenol PRN     Cardiovascular: Hemodynamically stable. HR controlled.  - Aortic stenosis s/p BAV, continue asa 81mg, atorvastatin 40mg qhs   - CAD found on cath, 50-60% LAD disease, patient non-compliant will treat medically   - AIDEE thrombus, heparin gtt to start in 4 hours pending PTT/PT/INR. Will start NOAC tomorrow if stable   - LBBB intraop, EKG , continue to monitor with TVP in place     Respiratory: 02 Sat = 96% on RA.  - CXR with R pleural effusion, IV diuresis, plan for possible pigtail tomorrow pending  CXR in AM   - Encourage C+DB and Use of IS 10x / hr while awake.    GI: Stable.  - Advance diet as tolerated   - Continue protonix 40mg for GI ppx   - Bowel regimen     Renal / : BUN/Cr 14/0.9   - Monitor renal function.  - Monitor I/O's.    Endocrine: Hgba1c and TSH pending   - continue insulin sliding scale   - monitor fingersticks      Prophylaxis: Heparin gtt and SCDs     Disposition:  -ICU for frequent monitoring.:

## 2021-11-11 NOTE — PROGRESS NOTE ADULT - SUBJECTIVE AND OBJECTIVE BOX
Patient discussed on afternoon rounds with Dr. Hernández      Operation / Date:  11/11/2021 BAV    SUBJECTIVE ASSESSMENT:  Patient seen this afternoon at bedside, doing well and not offering any complaints at this time. Moves all extremities to commands.     Vital Signs Last 24 Hrs  T(C): 36.8 (11 Nov 2021 10:30), Max: 37.2 (10 Nov 2021 21:44)  T(F): 98.2 (11 Nov 2021 10:30), Max: 99 (10 Nov 2021 21:44)  HR: 74 (11 Nov 2021 10:30) (70 - 81)  BP: 162/68 (11 Nov 2021 10:30) (108/63 - 162/68)  BP(mean): 98 (11 Nov 2021 08:51) (98 - 98)  RR: 19 (11 Nov 2021 10:30) (18 - 20)  SpO2: 96% (11 Nov 2021 10:30) (92% - 96%)  I&O's Detail    10 Nov 2021 07:01  -  11 Nov 2021 07:00  --------------------------------------------------------  IN:    Oral Fluid: 360 mL  Total IN: 360 mL    OUT:    Voided (mL): 450 mL  Total OUT: 450 mL    Total NET: -90 mL      11 Nov 2021 07:01  -  11 Nov 2021 14:34  --------------------------------------------------------  IN:  Total IN: 0 mL    OUT:    Oral Fluid: 0 mL  Total OUT: 0 mL    Total NET: 0 mL    CHEST TUBE:  No  KARLA DRAIN: No  EPICARDIAL WIRES: TVP via R groin   TIE DOWNS: No  PICKENS: No    PHYSICAL EXAM:    General: Patient lying comfortably in bed, no acute distress     Neurological: Alert and oriented. Moves all extremities to command. No focal neurological deficits     Cardiovascular: S1S2, RRR, systolic murmur appreciated at LUSB     Respiratory: Clear to ausculation bilaterally     Gastrointestinal: Abdomen soft, non tender, non distended     Extremities: Warm and well perfused. No edema or calf tenderness     Vascular: Peripheral pulses 2+ bilaterally     Incision Sites: R groin C/D/I, no hematoma     LABS:                        13.7   10.04 )-----------( 222      ( 10 Nov 2021 22:24 )             39.8       COUMADIN:  No    PT/INR - ( 09 Nov 2021 15:06 )   PT: 13.8 sec;   INR: 1.16          PTT - ( 10 Nov 2021 22:24 )  PTT:32.3 sec    11-10    143  |  107  |  14  ----------------------------<  103<H>  3.7   |  25  |  0.90    Ca    8.6      10 Nov 2021 07:24  Mg     2.4     11-10    TPro  6.0  /  Alb  3.4  /  TBili  0.6  /  DBili  x   /  AST  28  /  ALT  68<H>  /  AlkPhos  105  11-10      MEDICATIONS  (STANDING):  ceFAZolin   IVPB 2000 milliGRAM(s) IV Intermittent every 8 hours  glucagon  Injectable 1 milliGRAM(s) IntraMuscular once  heparin   Injectable 5000 Unit(s) SubCutaneous every 8 hours  insulin lispro (ADMELOG) corrective regimen sliding scale   SubCutaneous three times a day before meals  pantoprazole  Injectable 40 milliGRAM(s) IV Push daily  sodium chloride 0.9%. 1000 milliLiter(s) (10 mL/Hr) IV Continuous <Continuous>    MEDICATIONS  (PRN):    RADIOLOGY & ADDITIONAL TESTS:

## 2021-11-11 NOTE — PROGRESS NOTE ADULT - PROBLEM SELECTOR PLAN 4
VTE ppx: Heparin ggt  Diet: DASH. NPO after midnight for possible further w/u tomorrow  Dispo: Pending clinical progression  Goals of care discussion had w/ pt and daughter Gail, pt is now DNR/DNI, no feeding tubes, and Daughter Gail established as HCP. See C note for details. Pt and daughter are ammenable to DNR/DNI being waived for 30 days for possible upcoming procedure.  SW consult ordered per daughter's request to assess need for help at home, f/u recs  Consult PT once clinically appropriate given severe AS.    Case d/w Dr. Dumont. VTE ppx: Heparin gtt  Diet: DASH.  Dispo: Pending clinical progression, transfer to [facility] service   Goals of care discussion had w/ pt and daughter Gail, pt is now DNR/DNI, no feeding tubes, and Daughter Gail established as HCP. See GOC note for details. Pt and daughter are amenable to DNR/DNI being waived for 30 days for upcoming procedure.  SW consult ordered per daughter's request to assess need for help at home, f/u recs  Consult PT once clinically appropriate given severe AS.    Case d/w Dr. Dumont.

## 2021-11-11 NOTE — DIETITIAN INITIAL EVALUATION ADULT. - ADD RECOMMEND
1. As medically feasible advance to DASH/TLC Diet (monitor need for ONS) 2. RD to obtain subjective information/provide diet ed as medically feasible 3. Pain and bowel regimen per team 4. Monitor risk for malnutrition 1. As medically feasible advance to DASH/TLC Diet (monitor need for ONS) 2. RD to obtain subjective information/provide diet ed as medically feasible 3. Pain and bowel regimen per team 4. Monitor risk for malnutrition 5. Monitor/honor GOC at all times

## 2021-11-11 NOTE — PROGRESS NOTE ADULT - SUBJECTIVE AND OBJECTIVE BOX
INCOMPLETE    S: Pt seen and examined bedside.  Patient denies C/P, SOB, N/V, dizziness, palpitations, and diaphoresis.  Pt denies fever/chills, dysuria, abdominal pain, diarrhea, and cough  12 Point ROS otherwise negative except as per HPI/subjective.     O: Vital Signs Last 24 Hrs  T(C): 36.8 (11 Nov 2021 10:30), Max: 37.2 (10 Nov 2021 21:44)  T(F): 98.2 (11 Nov 2021 10:30), Max: 99 (10 Nov 2021 21:44)  HR: 74 (11 Nov 2021 10:30) (70 - 81)  BP: 162/68 (11 Nov 2021 10:30) (108/63 - 162/68)  BP(mean): 98 (11 Nov 2021 08:51) (98 - 98)  RR: 19 (11 Nov 2021 10:30) (17 - 20)  SpO2: 96% (11 Nov 2021 10:30) (92% - 96%)    PHYSICAL EXAM:  GEN: NAD  HEENT: No JVD  PULM:  CTA B/L  CARD:  RRR, S1 and S2   ABD: +BS, NT, soft/ND	  EXT: No Edema B/L LE  NEURO: A+Ox3, no focal deficit  PSYCH: Mood Appropriate    LABS:                        13.7   10.04 )-----------( 222      ( 10 Nov 2021 22:24 )             39.8     11-10    143  |  107  |  14  ----------------------------<  103<H>  3.7   |  25  |  0.90    Ca    8.6      10 Nov 2021 07:24  Mg     2.4     11-10    TPro  6.0  /  Alb  3.4  /  TBili  0.6  /  DBili  x   /  AST  28  /  ALT  68<H>  /  AlkPhos  105  11-10    PT/INR - ( 09 Nov 2021 15:06 )   PT: 13.8 sec;   INR: 1.16          PTT - ( 10 Nov 2021 22:24 )  PTT:32.3 sec  Troponin T, Serum: 0.05 ng/mL (11-09-21 @ 15:06)  Troponin T, Serum: 0.03 ng/mL (11-07-21 @ 00:21)      11-10 @ 07:01  -  11-11 @ 07:00  --------------------------------------------------------  IN: 360 mL / OUT: 450 mL / NET: -90 mL    11-11 @ 07:01  -  11-11 @ 12:11  --------------------------------------------------------  IN: 0 mL / OUT: 0 mL / NET: 0 mL      Daily Height in cm: 165.1 (11 Nov 2021 10:30)    Daily    S: Pt seen and examined bedside. Pt reports she is upset about being poked so many times for an IV yesterday and wants to walk around by herself.   ON 11/10: Pt refused telemetry and lab draw for PTT. Her IV also infiltrated which was removed and she refused to have a new one placed.   Patient denies C/P, SOB, N/V, dizziness, palpitations, and diaphoresis.  Pt denies fever/chills, dysuria, abdominal pain, diarrhea, and cough  12 Point ROS otherwise negative except as per HPI/subjective.     O: Vital Signs Last 24 Hrs  T(C): 36.8 (11 Nov 2021 10:30), Max: 37.2 (10 Nov 2021 21:44)  T(F): 98.2 (11 Nov 2021 10:30), Max: 99 (10 Nov 2021 21:44)  HR: 74 (11 Nov 2021 10:30) (70 - 81)  BP: 162/68 (11 Nov 2021 10:30) (108/63 - 162/68)  BP(mean): 98 (11 Nov 2021 08:51) (98 - 98)  RR: 19 (11 Nov 2021 10:30) (17 - 20)  SpO2: 96% (11 Nov 2021 10:30) (92% - 96%)    PHYSICAL EXAM:  Appearance: Normal	  HEENT:   Normal oral mucosa, PERRL, EOMI	  Neck: Supple, - JVD; no carotid Bruit   Cardiovascular: Normal S1 S2, No JVD, + murmur  Respiratory: Lungs clear to auscultation, No Rales, Rhonchi, or Wheezing	  Gastrointestinal:  Soft, Non-tender, + BS	  Skin: No rashes, No ecchymoses, No cyanosis  Extremities: Normal range of motion, No clubbing, cyanosis or edema  Vascular: Peripheral pulses palpable 2+ bilaterally  Neurologic: Non-focal  Psychiatry: A & O x 3, Mood & affect appropriate    LABS:                        13.7   10.04 )-----------( 222      ( 10 Nov 2021 22:24 )             39.8     11-10    143  |  107  |  14  ----------------------------<  103<H>  3.7   |  25  |  0.90    Ca    8.6      10 Nov 2021 07:24  Mg     2.4     11-10    TPro  6.0  /  Alb  3.4  /  TBili  0.6  /  DBili  x   /  AST  28  /  ALT  68<H>  /  AlkPhos  105  11-10    PT/INR - ( 09 Nov 2021 15:06 )   PT: 13.8 sec;   INR: 1.16          PTT - ( 10 Nov 2021 22:24 )  PTT:32.3 sec  Troponin T, Serum: 0.05 ng/mL (11-09-21 @ 15:06)  Troponin T, Serum: 0.03 ng/mL (11-07-21 @ 00:21)      11-10 @ 07:01  -  11-11 @ 07:00  --------------------------------------------------------  IN: 360 mL / OUT: 450 mL / NET: -90 mL    11-11 @ 07:01  -  11-11 @ 12:11  --------------------------------------------------------  IN: 0 mL / OUT: 0 mL / NET: 0 mL      Daily Height in cm: 165.1 (11 Nov 2021 10:30)    Daily

## 2021-11-11 NOTE — PROGRESS NOTE ADULT - ASSESSMENT
87 y/o Female with a hx of A fib and aortic stenosis presenting to the ED from her outpatient cardiologist for SOB and BRITTON in the setting of worsening aortic stenosis for intervention. 87 y/o F with a hx of Hodgkin's lymphoma s/p chemo, Afib and severe AS who presented to the ED from her outpatient cardiologist's office for symptoms of BRITTON. ECHO revealed severe AS, ROD 0.79cm^2. Hospital course complicated by new Afib, pt now in NSR. CTS consulted, plan for BAV and LHC today 11/11 with Dr. Hernández.  85 y/o F with a hx of Hodgkin's lymphoma s/p chemo, Afib and severe AS who presented to the ED from her outpatient cardiologist's office for symptoms of BRITTON. ECHO revealed severe AS, ROD 0.79cm^2. Hospital course complicated by new Afib, pt now in NSR. CTS consulted, plan for BAV, LHC and drainage of pleural effusions today 11/11 with Dr. Hernández.

## 2021-11-12 ENCOUNTER — TRANSCRIPTION ENCOUNTER (OUTPATIENT)
Age: 81
End: 2021-11-12

## 2021-11-12 VITALS — RESPIRATION RATE: 20 BRPM | OXYGEN SATURATION: 84 % | HEART RATE: 96 BPM

## 2021-11-12 LAB
A1C WITH ESTIMATED AVERAGE GLUCOSE RESULT: 5.6 % — SIGNIFICANT CHANGE UP (ref 4–5.6)
ANION GAP SERPL CALC-SCNC: 10 MMOL/L — SIGNIFICANT CHANGE UP (ref 5–17)
ANION GAP SERPL CALC-SCNC: 9 MMOL/L — SIGNIFICANT CHANGE UP (ref 5–17)
APTT BLD: 48.2 SEC — HIGH (ref 27.5–35.5)
APTT BLD: 60.8 SEC — HIGH (ref 27.5–35.5)
APTT BLD: 72.5 SEC — HIGH (ref 27.5–35.5)
BASOPHILS # BLD AUTO: 0.04 K/UL — SIGNIFICANT CHANGE UP (ref 0–0.2)
BASOPHILS NFR BLD AUTO: 0.4 % — SIGNIFICANT CHANGE UP (ref 0–2)
BUN SERPL-MCNC: 13 MG/DL — SIGNIFICANT CHANGE UP (ref 7–23)
BUN SERPL-MCNC: 14 MG/DL — SIGNIFICANT CHANGE UP (ref 7–23)
CALCIUM SERPL-MCNC: 8.4 MG/DL — SIGNIFICANT CHANGE UP (ref 8.4–10.5)
CALCIUM SERPL-MCNC: 8.6 MG/DL — SIGNIFICANT CHANGE UP (ref 8.4–10.5)
CHLORIDE SERPL-SCNC: 104 MMOL/L — SIGNIFICANT CHANGE UP (ref 96–108)
CHLORIDE SERPL-SCNC: 104 MMOL/L — SIGNIFICANT CHANGE UP (ref 96–108)
CO2 SERPL-SCNC: 25 MMOL/L — SIGNIFICANT CHANGE UP (ref 22–31)
CO2 SERPL-SCNC: 25 MMOL/L — SIGNIFICANT CHANGE UP (ref 22–31)
CREAT SERPL-MCNC: 0.86 MG/DL — SIGNIFICANT CHANGE UP (ref 0.5–1.3)
CREAT SERPL-MCNC: 0.88 MG/DL — SIGNIFICANT CHANGE UP (ref 0.5–1.3)
EOSINOPHIL # BLD AUTO: 0.3 K/UL — SIGNIFICANT CHANGE UP (ref 0–0.5)
EOSINOPHIL NFR BLD AUTO: 3.2 % — SIGNIFICANT CHANGE UP (ref 0–6)
ESTIMATED AVERAGE GLUCOSE: 114 MG/DL — SIGNIFICANT CHANGE UP (ref 68–114)
GAS PNL BLDA: SIGNIFICANT CHANGE UP
GLUCOSE BLDC GLUCOMTR-MCNC: 101 MG/DL — HIGH (ref 70–99)
GLUCOSE SERPL-MCNC: 102 MG/DL — HIGH (ref 70–99)
GLUCOSE SERPL-MCNC: 104 MG/DL — HIGH (ref 70–99)
HCT VFR BLD CALC: 33.6 % — LOW (ref 34.5–45)
HGB BLD-MCNC: 11 G/DL — LOW (ref 11.5–15.5)
IMM GRANULOCYTES NFR BLD AUTO: 0.4 % — SIGNIFICANT CHANGE UP (ref 0–1.5)
INR BLD: 1.17 — HIGH (ref 0.88–1.16)
LYMPHOCYTES # BLD AUTO: 1.35 K/UL — SIGNIFICANT CHANGE UP (ref 1–3.3)
LYMPHOCYTES # BLD AUTO: 14.6 % — SIGNIFICANT CHANGE UP (ref 13–44)
MAGNESIUM SERPL-MCNC: 2 MG/DL — SIGNIFICANT CHANGE UP (ref 1.6–2.6)
MAGNESIUM SERPL-MCNC: 2 MG/DL — SIGNIFICANT CHANGE UP (ref 1.6–2.6)
MCHC RBC-ENTMCNC: 30.3 PG — SIGNIFICANT CHANGE UP (ref 27–34)
MCHC RBC-ENTMCNC: 32.7 GM/DL — SIGNIFICANT CHANGE UP (ref 32–36)
MCV RBC AUTO: 92.6 FL — SIGNIFICANT CHANGE UP (ref 80–100)
MONOCYTES # BLD AUTO: 0.73 K/UL — SIGNIFICANT CHANGE UP (ref 0–0.9)
MONOCYTES NFR BLD AUTO: 7.9 % — SIGNIFICANT CHANGE UP (ref 2–14)
NEUTROPHILS # BLD AUTO: 6.79 K/UL — SIGNIFICANT CHANGE UP (ref 1.8–7.4)
NEUTROPHILS NFR BLD AUTO: 73.5 % — SIGNIFICANT CHANGE UP (ref 43–77)
NRBC # BLD: 0 /100 WBCS — SIGNIFICANT CHANGE UP (ref 0–0)
PHOSPHATE SERPL-MCNC: 3.7 MG/DL — SIGNIFICANT CHANGE UP (ref 2.5–4.5)
PLATELET # BLD AUTO: 191 K/UL — SIGNIFICANT CHANGE UP (ref 150–400)
POTASSIUM SERPL-MCNC: 3.6 MMOL/L — SIGNIFICANT CHANGE UP (ref 3.5–5.3)
POTASSIUM SERPL-MCNC: 4.8 MMOL/L — SIGNIFICANT CHANGE UP (ref 3.5–5.3)
POTASSIUM SERPL-SCNC: 3.6 MMOL/L — SIGNIFICANT CHANGE UP (ref 3.5–5.3)
POTASSIUM SERPL-SCNC: 4.8 MMOL/L — SIGNIFICANT CHANGE UP (ref 3.5–5.3)
PROTHROM AB SERPL-ACNC: 13.9 SEC — HIGH (ref 10.6–13.6)
RBC # BLD: 3.63 M/UL — LOW (ref 3.8–5.2)
RBC # FLD: 14.5 % — SIGNIFICANT CHANGE UP (ref 10.3–14.5)
SODIUM SERPL-SCNC: 138 MMOL/L — SIGNIFICANT CHANGE UP (ref 135–145)
SODIUM SERPL-SCNC: 139 MMOL/L — SIGNIFICANT CHANGE UP (ref 135–145)
TSH SERPL-MCNC: 4.89 UIU/ML — HIGH (ref 0.27–4.2)
WBC # BLD: 9.25 K/UL — SIGNIFICANT CHANGE UP (ref 3.8–10.5)
WBC # FLD AUTO: 9.25 K/UL — SIGNIFICANT CHANGE UP (ref 3.8–10.5)

## 2021-11-12 PROCEDURE — 76604 US EXAM CHEST: CPT | Mod: 26

## 2021-11-12 PROCEDURE — 71045 X-RAY EXAM CHEST 1 VIEW: CPT | Mod: 26

## 2021-11-12 PROCEDURE — 93306 TTE W/DOPPLER COMPLETE: CPT | Mod: 26

## 2021-11-12 RX ORDER — PANTOPRAZOLE SODIUM 20 MG/1
1 TABLET, DELAYED RELEASE ORAL
Qty: 30 | Refills: 0
Start: 2021-11-12 | End: 2021-12-11

## 2021-11-12 RX ORDER — METOPROLOL TARTRATE 50 MG
0.5 TABLET ORAL
Qty: 15 | Refills: 0
Start: 2021-11-12 | End: 2021-12-11

## 2021-11-12 RX ORDER — POLYETHYLENE GLYCOL 3350 17 G/17G
17 POWDER, FOR SOLUTION ORAL
Qty: 119 | Refills: 0
Start: 2021-11-12 | End: 2021-11-18

## 2021-11-12 RX ORDER — ASPIRIN/CALCIUM CARB/MAGNESIUM 324 MG
1 TABLET ORAL
Qty: 30 | Refills: 0
Start: 2021-11-12 | End: 2021-12-11

## 2021-11-12 RX ORDER — FUROSEMIDE 40 MG
20 TABLET ORAL ONCE
Refills: 0 | Status: COMPLETED | OUTPATIENT
Start: 2021-11-12 | End: 2021-11-12

## 2021-11-12 RX ORDER — PANTOPRAZOLE SODIUM 20 MG/1
40 TABLET, DELAYED RELEASE ORAL
Refills: 0 | Status: DISCONTINUED | OUTPATIENT
Start: 2021-11-12 | End: 2021-11-12

## 2021-11-12 RX ORDER — FUROSEMIDE 40 MG
40 TABLET ORAL DAILY
Refills: 0 | Status: DISCONTINUED | OUTPATIENT
Start: 2021-11-13 | End: 2021-11-12

## 2021-11-12 RX ORDER — HYDROMORPHONE HYDROCHLORIDE 2 MG/ML
0.5 INJECTION INTRAMUSCULAR; INTRAVENOUS; SUBCUTANEOUS ONCE
Refills: 0 | Status: DISCONTINUED | OUTPATIENT
Start: 2021-11-12 | End: 2021-11-12

## 2021-11-12 RX ORDER — ACETAMINOPHEN 500 MG
2 TABLET ORAL
Qty: 240 | Refills: 0
Start: 2021-11-12 | End: 2021-12-11

## 2021-11-12 RX ORDER — FUROSEMIDE 40 MG
1 TABLET ORAL
Qty: 30 | Refills: 0
Start: 2021-11-12 | End: 2021-12-11

## 2021-11-12 RX ORDER — SENNA PLUS 8.6 MG/1
2 TABLET ORAL
Qty: 14 | Refills: 0
Start: 2021-11-12 | End: 2021-11-18

## 2021-11-12 RX ORDER — APIXABAN 2.5 MG/1
2.5 TABLET, FILM COATED ORAL EVERY 12 HOURS
Refills: 0 | Status: DISCONTINUED | OUTPATIENT
Start: 2021-11-12 | End: 2021-11-12

## 2021-11-12 RX ORDER — ATORVASTATIN CALCIUM 80 MG/1
1 TABLET, FILM COATED ORAL
Qty: 30 | Refills: 0
Start: 2021-11-12 | End: 2021-12-11

## 2021-11-12 RX ORDER — APIXABAN 2.5 MG/1
1 TABLET, FILM COATED ORAL
Qty: 60 | Refills: 0
Start: 2021-11-12 | End: 2021-12-11

## 2021-11-12 RX ADMIN — Medication 100 MILLIGRAM(S): at 06:33

## 2021-11-12 RX ADMIN — Medication 81 MILLIGRAM(S): at 12:30

## 2021-11-12 RX ADMIN — APIXABAN 2.5 MILLIGRAM(S): 2.5 TABLET, FILM COATED ORAL at 15:39

## 2021-11-12 RX ADMIN — Medication 20 MILLIGRAM(S): at 15:07

## 2021-11-12 RX ADMIN — ATORVASTATIN CALCIUM 40 MILLIGRAM(S): 80 TABLET, FILM COATED ORAL at 18:57

## 2021-11-12 NOTE — DISCHARGE NOTE PROVIDER - PROVIDER TOKENS
PROVIDER:[TOKEN:[[id number]],SCHEDULEDAPPT:[11/22/2021],SCHEDULEDAPPTTIME:[12:45 PM]],PROVIDER:[TOKEN:[[id number]],FOLLOWUP:[1 week]]

## 2021-11-12 NOTE — PROGRESS NOTE ADULT - ASSESSMENT
A/P: 81 year old female with PMHx of Afib (not on AC), Aortic stenosis presented to [facility] ED for acute on chronic CHF exacerbation. Structural heart consulted for known severe aortic stenosis. Patient now s/p BAV with Dr. Hernández. Procedure significant for LBBB intraop and patient transferred to  as mini ICU with R groin TVP. QRS narrowed overnight     Neurovascular: Stable.. Pain well controlled with current regimen.  - Continue tylenol PRN     Cardiovascular: Hemodynamically stable. HR controlled.  - Aortic stenosis s/p BAV, continue asa 81mg, atorvastatin 40mg qhs   - CAD found on cath, 50-60% LAD disease, patient non-compliant will treat medically   - AIDEE thrombus, heparin gtt to start in 4 hours pending PTT/PT/INR. Will start NOAC tomorrow if stable   - LBBB intraop, EKG , continue to monitor with TVP in place     Respiratory: 02 Sat = 96% on RA.  - CXR with R pleural effusion, IV diuresis, plan for possible pigtail tomorrow pending  CXR in AM   - Encourage C+DB and Use of IS 10x / hr while awake.    GI: Stable.  - Advance diet as tolerated   - Continue protonix 40mg for GI ppx   - Bowel regimen     Renal / : BUN/Cr 14/0.9   - Monitor renal function.  - Monitor I/O's.    Endocrine: Hgba1c and TSH pending   - continue insulin sliding scale   - monitor fingersticks      Prophylaxis: Heparin gtt and SCDs     Disposition:  -ICU for frequent monitoring.:     A/P: 81 year old female with PMHx of Afib (not on AC), Aortic stenosis presented to [facility] ED for acute on chronic CHF exacerbation. Structural heart consulted for known severe aortic stenosis. Patient now s/p BAV with Dr. Hernández. Procedure significant for LBBB intraop and patient transferred to  as mini ICU with R groin TVP. QRS narrowed overnight and TVP was removed this morning.     Neurovascular: Stable.. Pain well controlled with current regimen.  - Continue tylenol PRN     Cardiovascular: Hemodynamically stable. HR controlled.  - Aortic stenosis s/p BAV, continue asa 81mg, atorvastatin 40mg qhs   - CAD found on cath, 50-60% LAD disease, patient non-compliant will treat medically   - AIDEE thrombus, continue heparin gtt at 8cc/hr with goal PTT 60-80   - TTE pending     Respiratory: 02 Sat = 96% on RA.  - CXR with R pleural effusion, slightly improved on AM cxr, will ultrasound this morning if patient is agreeable.   - Encourage C+DB and Use of IS 10x / hr while awake.    GI: Stable.  - Continue PO diet   - Continue protonix 40mg for GI ppx   - Bowel regimen     Renal / : BUN/Cr 13/0.86   - Monitor renal function.  - Monitor I/O's.    Endocrine: Hgba1c 5.6, TSH pending   - continue insulin sliding scale   - monitor fingersticks      Prophylaxis: Heparin gtt and SCDs     Disposition:  -ICU for frequent monitoring.:

## 2021-11-12 NOTE — PROGRESS NOTE ADULT - SUBJECTIVE AND OBJECTIVE BOX
Patient discussed on morning rounds with Dr. Gonzalez  / Dr. Carrington      Operation / Date: 11/11/2021 BAV    SUBJECTIVE ASSESSMENT:  Patient seen this morning at bedside, very agitated and refusing to cooperate with physical exam.     Vital Signs Last 24 Hrs  T(C): 36.9 (12 Nov 2021 09:00), Max: 36.9 (12 Nov 2021 09:00)  T(F): 98.4 (12 Nov 2021 09:00), Max: 98.4 (12 Nov 2021 09:00)  HR: 68 (12 Nov 2021 10:00) (57 - 106)  BP: 108/55 (11 Nov 2021 21:00) (108/55 - 162/68)  BP(mean): 79 (11 Nov 2021 21:00) (79 - 79)  RR: 24 (12 Nov 2021 10:00) (12 - 35)  SpO2: 95% (12 Nov 2021 10:00) (93% - 100%)  I&O's Detail    11 Nov 2021 07:01  -  12 Nov 2021 07:00  --------------------------------------------------------  IN:    Dexmedetomidine: 8.8 mL    Heparin: 77.5 mL    IV PiggyBack: 500 mL    Oral Fluid: 300 mL    Phenylephrine: 73.8 mL    sodium chloride 0.9%: 120 mL  Total IN: 1080.1 mL    OUT:    Voided (mL): 1100 mL  Total OUT: 1100 mL    Total NET: -19.9 mL      12 Nov 2021 07:01  -  12 Nov 2021 10:29  --------------------------------------------------------  IN:    Heparin: 24 mL  Total IN: 24 mL    OUT:    Voided (mL): 800 mL  Total OUT: 800 mL    Total NET: -776 mL    CHEST TUBE:  No  KARLA DRAIN: No  EPICARDIAL WIRES: No  TIE DOWNS: No  PICKENS: No    PHYSICAL EXAM:    General: Patient lying comfortably in bed, no acute distress     Neurological: Alert and oriented.  No focal neurological deficits     Cardiovascular: S1S2, RRR, systolic murmur appreciated at LUSB     Respiratory: Clear to ausculation bilaterally     Gastrointestinal: Abdomen soft, non tender, non distended     Extremities: Warm and well perfused. No edema or calf tenderness     Vascular: Peripheral pulses 2+ bilaterally     Incision Sites: R groin C/D/I, no hematoma     LABS:                        11.0   9.25  )-----------( 191      ( 12 Nov 2021 06:31 )             33.6       COUMADIN:  No    PT/INR - ( 12 Nov 2021 06:31 )   PT: 13.9 sec;   INR: 1.17          PTT - ( 12 Nov 2021 06:31 )  PTT:60.8 sec    11-12    138  |  104  |  13  ----------------------------<  102<H>  4.8   |  25  |  0.86    Ca    8.6      12 Nov 2021 06:31  Phos  3.7     11-12  Mg     2.0     11-12    TPro  6.4  /  Alb  3.7  /  TBili  0.6  /  DBili  x   /  AST  20  /  ALT  44  /  AlkPhos  92  11-11          MEDICATIONS  (STANDING):  acetaminophen   IVPB .. 1000 milliGRAM(s) IV Intermittent once  aspirin enteric coated 81 milliGRAM(s) Oral daily  atorvastatin 40 milliGRAM(s) Oral at bedtime  furosemide    Tablet 20 milliGRAM(s) Oral once  glucagon  Injectable 1 milliGRAM(s) IntraMuscular once  heparin  Infusion 650 Unit(s)/Hr (8 mL/Hr) IV Continuous <Continuous>  insulin lispro (ADMELOG) corrective regimen sliding scale   SubCutaneous Before meals and at bedtime  melatonin 5 milliGRAM(s) Oral at bedtime  pantoprazole  Injectable 40 milliGRAM(s) IV Push daily  polyethylene glycol 3350 17 Gram(s) Oral daily  senna 2 Tablet(s) Oral at bedtime  sodium chloride 0.9%. 1000 milliLiter(s) (10 mL/Hr) IV Continuous <Continuous>    MEDICATIONS  (PRN):        RADIOLOGY & ADDITIONAL TESTS:

## 2021-11-12 NOTE — DISCHARGE NOTE PROVIDER - HOSPITAL COURSE
81 year old female with PMHx of Afib (not on AC), Aortic stenosis presented to [facility] ED on 11/9/21 complaining of SOB, admitted to cardiology for acute on chronic CHF exacerbation. Structural heart consulted for known severe aortic stenosis. Preoperative workup was completed and patient underwent BAV with Dr. Hernández on 11/11/2021. Procedure was significant for transient LBBB and she was transferred to  as mini ICU with TVP via R groin. Intraop patient found to have AIDEE thrombus and was started on heparin gtt which was converted to PO eliquis on POD#1. QRS narrowed overnight and TVP was removed POD#1. TTE performed revealed improvement in severe aortic stenosis gradients with mild-moderate AI. Patient remained agitated post operatively, refusing care and pulling at lines. She had a bedside ultrasound on POD#1 revealing bilateral pleural effusions which patient refused intervention and requested diuresis instead. As per Dr. Carrington patient is stable and ready for discharge home on POD#1 with MCOT.     35 minutes was spent with the patient reviewing the discharge material including medications, follow up appointments, recovery, concerning symptoms, and how to contact their health care providers if they have questions

## 2021-11-12 NOTE — PROVIDER CONTACT NOTE (OTHER) - SITUATION
pt s/p BAV on 11/11 pt transferred from [facility] Dr Gonzalez at bedside seen pt. As per Dr Gonzalez pt mus walk in hallway. Pt cuff bp 89/68 pt asymptomatic a line in place.

## 2021-11-12 NOTE — DISCHARGE NOTE PROVIDER - CARE PROVIDER_API CALL
Nahid Hernández)  Cardiology; Interventional Cardiology  [address], [address] [place] [address]  Phone: [phone]  Fax: [phone]  Scheduled Appointment: 11/22/2021 12:45 PM    Iman Parsons)  Cardiovascular Medicine  [facility], [address] [place] [address], [address]  [place] [address]  Phone: [phone]  Fax: [phone]  Follow Up Time: 1 week

## 2021-11-12 NOTE — DISCHARGE NOTE PROVIDER - NSDCMRMEDTOKEN_GEN_ALL_CORE_FT
acetaminophen 325 mg oral tablet: 2 tab(s) orally every 6 hours, As Needed for mild pain   apixaban 2.5 mg oral tablet: 1 tab(s) orally every 12 hours  aspirin 81 mg oral delayed release tablet: 1 tab(s) orally once a day  atorvastatin 40 mg oral tablet: 1 tab(s) orally once a day (at bedtime)  furosemide 40 mg oral tablet: 1 tab(s) orally once a day  metoprolol succinate 25 mg oral capsule, extended release: 0.5 cap(s) orally once a day   pantoprazole 40 mg oral delayed release tablet: 1 tab(s) orally once a day (before a meal)  polyethylene glycol 3350 oral powder for reconstitution: 17 gram(s) orally once a day, As Needed -for constipation   senna oral tablet: 2 tab(s) orally once a day (at bedtime)

## 2021-11-12 NOTE — DISCHARGE NOTE NURSING/CASE MANAGEMENT/SOCIAL WORK - PATIENT PORTAL LINK FT
You can access the FollowMyHealth Patient Portal offered by [facility] by registering at the following website: [email or web address]. By joining [employer]’s FollowMyHealth portal, you will also be able to view your health information using other applications (apps) compatible with our system.

## 2021-11-12 NOTE — DISCHARGE NOTE PROVIDER - NSDCCPTREATMENT_GEN_ALL_CORE_FT
PRINCIPAL PROCEDURE  Procedure: Balloon valvuloplasty, aortic  Findings and Treatment:       SECONDARY PROCEDURE  Procedure: Balloon valvuloplasty, aortic  Findings and Treatment:

## 2021-11-12 NOTE — CHART NOTE - NSCHARTNOTEFT_GEN_A_CORE
Exam:  US Chest    Procedure Date:    History: 81y Female whose CXR today shows a possible right sided pleural effusion.  Pt is POD #1 from BAV    Findings:                    Evaluation of the left side of the thoracic cavity demonstrates                   trace pleural effusion without significant window for drainage.                   Lung is freely movable with in thoracic cavity                    Evaluation of the right side of the thoracic cavity demonstrates                   small - moderate pleural effusion                  Lung is freely movable with in thoracic cavity      Impression: Small-moderate pleural effusion, per patient and family would prefer to try diuresis prior to drainage.
80 y/o F w/ Hx of A-Fib (previously on metoprolol, but says she has not taken it in >1 year and no anti-coagulant use), and aortic stenosis, past visit on 11/6/21 for SOB and weakness and found to have congestive heart failure. Concern for worsening aortic stenosis at the time but signed out AMA. Pt followed up w/ Dr. Parsons (cardiology), echo was done in office and found aortic stenosis to be critical at this point so pt presents to ED for evaluation today. Pt endorses a hard time lying flat on her back and has dyspnea on exertion, fatigue, chest pain, and occasionally feels her heart racing for ~1month and increasingly worse the past 4 days. Reports last year she was told she may need surgery in ~ year but felt fine. Denies hx of CAD, HTN, HLD. Otherwise denies lower extremity swelling, HA, blurry vision, cough, fever, chills, sputum production. Denies taking any medication besides vitamins.  Structural heart disease team was consulted for her critical AS.     Plan:  - Case discussed and images reviewed with Dr. Hernández  - Patient has critical AS  - Patient will need a TAVR in the near future  - Will plan for possible Cath and BAV this admission with staged TAVR at later date with embolic protection  - NPO at midnight in case of procedure tomorrow AM  - Please obtain carotid ultrasound  - Patient may need pigtail placement to drain pleural effusions, will f/u in AM

## 2021-11-12 NOTE — DISCHARGE NOTE PROVIDER - NSDCFUADDINST_GEN_ALL_CORE_FT
- Call us with any questions #[phone].    You had a MCOT monitor (an external cardiac rhythm monitoring device) placed on your day of discharge.  This helps us monitor your heart while you are out of the hospital for 30 days after discharge. Should your heart go into an abnormal or dangerous rhythm you will receieve a call from the MCOT team and your Structural Heart team of Doctors and PA's will be notified.    1. Keep the monitor within 30 feet of you at all times.  2. When you feel any symptom (chest pain, dizziness, palpitations, weakness, fatigue or anything outside of your normal), press the “Record Symptoms” button on the main phone of your phone  3. Shower or exercise as normal while wearing the MCOT Patch. Do not swim or take a bath. Patch is water-resistant, not waterproof  4. When the battery is low on the phone or on the device, use the supplied . The monitor will show a warning message when the battery is low.  5. Do not remove the patch from your skin after you begin monitoring. With normal wear, each patch should last 5 days. To replace the patch follow instructions in the MCOT box with the Patch Guide  6. Any issues with the MCOT device or phone please call Customer Service at [phone].  7. If you have any other questions at all please call the Structural Heart office at [phone]     - Walk daily as tolerated and use your incentive spirometer every hour.    - No driving or strenuous activity/exercise until cleared by your surgeon.    - Gently clean your incisions with anti-bacterial soap and water, pat dry.  You may leave them open to air.    - Call your doctor if you have shortness of breath, chest pain not relieved by pain medication, dizziness, fever >101.5, or increased redness or drainage from incisions.

## 2021-11-12 NOTE — DISCHARGE NOTE PROVIDER - CARE PROVIDERS DIRECT ADDRESSES
[email or web address]

## 2021-11-17 DIAGNOSIS — J90 PLEURAL EFFUSION, NOT ELSEWHERE CLASSIFIED: ICD-10-CM

## 2021-11-17 DIAGNOSIS — K55.1 CHRONIC VASCULAR DISORDERS OF INTESTINE: ICD-10-CM

## 2021-11-17 DIAGNOSIS — I35.0 NONRHEUMATIC AORTIC (VALVE) STENOSIS: ICD-10-CM

## 2021-11-17 DIAGNOSIS — I51.3 INTRACARDIAC THROMBOSIS, NOT ELSEWHERE CLASSIFIED: ICD-10-CM

## 2021-11-17 DIAGNOSIS — Z91.14 PATIENT'S OTHER NONCOMPLIANCE WITH MEDICATION REGIMEN: ICD-10-CM

## 2021-11-17 DIAGNOSIS — I25.10 ATHEROSCLEROTIC HEART DISEASE OF NATIVE CORONARY ARTERY WITHOUT ANGINA PECTORIS: ICD-10-CM

## 2021-11-17 DIAGNOSIS — Z53.29 PROCEDURE AND TREATMENT NOT CARRIED OUT BECAUSE OF PATIENT'S DECISION FOR OTHER REASONS: ICD-10-CM

## 2021-11-17 DIAGNOSIS — I44.7 LEFT BUNDLE-BRANCH BLOCK, UNSPECIFIED: ICD-10-CM

## 2021-11-17 DIAGNOSIS — I48.91 UNSPECIFIED ATRIAL FIBRILLATION: ICD-10-CM

## 2021-11-17 DIAGNOSIS — R45.1 RESTLESSNESS AND AGITATION: ICD-10-CM

## 2021-11-17 DIAGNOSIS — I50.33 ACUTE ON CHRONIC DIASTOLIC (CONGESTIVE) HEART FAILURE: ICD-10-CM

## 2021-11-22 ENCOUNTER — APPOINTMENT (OUTPATIENT)
Dept: CARDIOTHORACIC SURGERY | Facility: CLINIC | Age: 81
End: 2021-11-22
Payer: MEDICARE

## 2021-11-22 ENCOUNTER — OUTPATIENT (OUTPATIENT)
Dept: OUTPATIENT SERVICES | Facility: HOSPITAL | Age: 81
LOS: 1 days | End: 2021-11-22
Payer: MEDICARE

## 2021-11-22 VITALS
RESPIRATION RATE: 17 BRPM | SYSTOLIC BLOOD PRESSURE: 135 MMHG | DIASTOLIC BLOOD PRESSURE: 61 MMHG | HEIGHT: 60 IN | OXYGEN SATURATION: 94 % | WEIGHT: 110 LBS | HEART RATE: 69 BPM | TEMPERATURE: 97.9 F | BODY MASS INDEX: 21.6 KG/M2

## 2021-11-22 DIAGNOSIS — I35.0 NONRHEUMATIC AORTIC (VALVE) STENOSIS: ICD-10-CM

## 2021-11-22 DIAGNOSIS — R19.7 DIARRHEA, UNSPECIFIED: ICD-10-CM

## 2021-11-22 LAB
ALBUMIN SERPL ELPH-MCNC: 3.7 G/DL — SIGNIFICANT CHANGE UP (ref 3.3–5)
ALP SERPL-CCNC: 89 U/L — SIGNIFICANT CHANGE UP (ref 40–120)
ALT FLD-CCNC: 13 U/L — SIGNIFICANT CHANGE UP (ref 10–45)
ANION GAP SERPL CALC-SCNC: 11 MMOL/L — SIGNIFICANT CHANGE UP (ref 5–17)
APPEARANCE UR: CLEAR — SIGNIFICANT CHANGE UP
AST SERPL-CCNC: 16 U/L — SIGNIFICANT CHANGE UP (ref 10–40)
BACTERIA # UR AUTO: SIGNIFICANT CHANGE UP /HPF
BASOPHILS # BLD AUTO: 0.04 K/UL — SIGNIFICANT CHANGE UP (ref 0–0.2)
BASOPHILS NFR BLD AUTO: 0.5 % — SIGNIFICANT CHANGE UP (ref 0–2)
BILIRUB SERPL-MCNC: 0.4 MG/DL — SIGNIFICANT CHANGE UP (ref 0.2–1.2)
BILIRUB UR-MCNC: NEGATIVE — SIGNIFICANT CHANGE UP
BUN SERPL-MCNC: 18 MG/DL — SIGNIFICANT CHANGE UP (ref 7–23)
CALCIUM SERPL-MCNC: 9.4 MG/DL — SIGNIFICANT CHANGE UP (ref 8.4–10.5)
CHLORIDE SERPL-SCNC: 98 MMOL/L — SIGNIFICANT CHANGE UP (ref 96–108)
CO2 SERPL-SCNC: 28 MMOL/L — SIGNIFICANT CHANGE UP (ref 22–31)
COLOR SPEC: YELLOW — SIGNIFICANT CHANGE UP
CREAT SERPL-MCNC: 0.92 MG/DL — SIGNIFICANT CHANGE UP (ref 0.5–1.3)
DIFF PNL FLD: ABNORMAL
EOSINOPHIL # BLD AUTO: 0.29 K/UL — SIGNIFICANT CHANGE UP (ref 0–0.5)
EOSINOPHIL NFR BLD AUTO: 3.8 % — SIGNIFICANT CHANGE UP (ref 0–6)
EPI CELLS # UR: SIGNIFICANT CHANGE UP /HPF (ref 0–5)
GLUCOSE SERPL-MCNC: 120 MG/DL — HIGH (ref 70–99)
GLUCOSE UR QL: NEGATIVE — SIGNIFICANT CHANGE UP
HCT VFR BLD CALC: 35.4 % — SIGNIFICANT CHANGE UP (ref 34.5–45)
HGB BLD-MCNC: 11.2 G/DL — LOW (ref 11.5–15.5)
IMM GRANULOCYTES NFR BLD AUTO: 0.4 % — SIGNIFICANT CHANGE UP (ref 0–1.5)
KETONES UR-MCNC: NEGATIVE — SIGNIFICANT CHANGE UP
LEUKOCYTE ESTERASE UR-ACNC: NEGATIVE — SIGNIFICANT CHANGE UP
LYMPHOCYTES # BLD AUTO: 1.17 K/UL — SIGNIFICANT CHANGE UP (ref 1–3.3)
LYMPHOCYTES # BLD AUTO: 15.5 % — SIGNIFICANT CHANGE UP (ref 13–44)
MCHC RBC-ENTMCNC: 29.8 PG — SIGNIFICANT CHANGE UP (ref 27–34)
MCHC RBC-ENTMCNC: 31.6 GM/DL — LOW (ref 32–36)
MCV RBC AUTO: 94.1 FL — SIGNIFICANT CHANGE UP (ref 80–100)
MONOCYTES # BLD AUTO: 0.68 K/UL — SIGNIFICANT CHANGE UP (ref 0–0.9)
MONOCYTES NFR BLD AUTO: 9 % — SIGNIFICANT CHANGE UP (ref 2–14)
NEUTROPHILS # BLD AUTO: 5.34 K/UL — SIGNIFICANT CHANGE UP (ref 1.8–7.4)
NEUTROPHILS NFR BLD AUTO: 70.8 % — SIGNIFICANT CHANGE UP (ref 43–77)
NITRITE UR-MCNC: NEGATIVE — SIGNIFICANT CHANGE UP
NRBC # BLD: 0 /100 WBCS — SIGNIFICANT CHANGE UP (ref 0–0)
PH UR: 6 — SIGNIFICANT CHANGE UP (ref 5–8)
PLATELET # BLD AUTO: 447 K/UL — HIGH (ref 150–400)
POTASSIUM SERPL-MCNC: 3 MMOL/L — LOW (ref 3.5–5.3)
POTASSIUM SERPL-SCNC: 3 MMOL/L — LOW (ref 3.5–5.3)
PROT SERPL-MCNC: 7.2 G/DL — SIGNIFICANT CHANGE UP (ref 6–8.3)
PROT UR-MCNC: NEGATIVE MG/DL — SIGNIFICANT CHANGE UP
RBC # BLD: 3.76 M/UL — LOW (ref 3.8–5.2)
RBC # FLD: 13.9 % — SIGNIFICANT CHANGE UP (ref 10.3–14.5)
RBC CASTS # UR COMP ASSIST: < 5 /HPF — SIGNIFICANT CHANGE UP
SODIUM SERPL-SCNC: 137 MMOL/L — SIGNIFICANT CHANGE UP (ref 135–145)
SP GR SPEC: 1.02 — SIGNIFICANT CHANGE UP (ref 1–1.03)
UROBILINOGEN FLD QL: 1 E.U./DL — SIGNIFICANT CHANGE UP
WBC # BLD: 7.55 K/UL — SIGNIFICANT CHANGE UP (ref 3.8–10.5)
WBC # FLD AUTO: 7.55 K/UL — SIGNIFICANT CHANGE UP (ref 3.8–10.5)
WBC UR QL: < 5 /HPF — SIGNIFICANT CHANGE UP

## 2021-11-22 PROCEDURE — 85025 COMPLETE CBC W/AUTO DIFF WBC: CPT

## 2021-11-22 PROCEDURE — 80053 COMPREHEN METABOLIC PANEL: CPT

## 2021-11-22 PROCEDURE — 36415 COLL VENOUS BLD VENIPUNCTURE: CPT

## 2021-11-22 PROCEDURE — 99024 POSTOP FOLLOW-UP VISIT: CPT

## 2021-11-22 PROCEDURE — 81001 URINALYSIS AUTO W/SCOPE: CPT

## 2021-11-22 PROCEDURE — 93000 ELECTROCARDIOGRAM COMPLETE: CPT

## 2021-11-23 RX ORDER — PANTOPRAZOLE 40 MG/1
40 TABLET, DELAYED RELEASE ORAL DAILY
Qty: 30 | Refills: 2 | Status: ACTIVE | COMMUNITY

## 2021-11-23 RX ORDER — ATORVASTATIN CALCIUM 40 MG/1
40 TABLET, FILM COATED ORAL
Refills: 0 | Status: ACTIVE | COMMUNITY

## 2021-11-23 NOTE — REVIEW OF SYSTEMS
[Feeling Fatigued] : feeling fatigued [Dyspnea on exertion] : dyspnea during exertion [Abdominal Pain] : abdominal pain [Nausea] : nausea [Change in Appetite] : change in appetite [Diarrhea] : diarrhea [Negative] : Psychiatric

## 2021-12-01 NOTE — PHYSICAL EXAM
[No Acute Distress] : no acute distress [Normal Conjunctiva] : normal conjunctiva [Soft] : abdomen soft [Non Tender] : non-tender [No Masses/organomegaly] : no masses/organomegaly [Normal] : alert and oriented, normal memory [de-identified] : III/VI GORDO [de-identified] : groins are soft, nontender bilaterally with no hematomas present

## 2021-12-01 NOTE — HISTORY OF PRESENT ILLNESS
[FreeTextEntry1] : 81 year old female with a history of hypertension, hyperlipidemia, Afib (on Eliquis), chronic diastolic heart failure with severe AS s/p BAV on 11/11/2021 who presents for follow up after discharge. \par \par The patient presented to the ED after an ECHO at Dr. Parsons's office showed critical aortic stenosis.  The patient was deemed a candidate for a cath and BAV.  The patient tolerated the procedure well, did develop transient LBBB. She remained stable and was discharge home on POD #1 with [facility]OT. \par \par Since discharge, the patient has not been feeling well.  She complains of little appetite with nausea and diarrhea.  She complains of weakness and some shortness of breath with exertion.  She had right sided stomach pains that have since gone away.  She denies chest pain, shortness of breath at rest, dizziness, syncope, orthopnea, PND, or LE edema.  \par \par The patient is no currently wearing the MCOT monitor as instructed.

## 2021-12-19 ENCOUNTER — FORM ENCOUNTER (OUTPATIENT)
Age: 81
End: 2021-12-19

## 2021-12-20 ENCOUNTER — OUTPATIENT (OUTPATIENT)
Dept: OUTPATIENT SERVICES | Facility: HOSPITAL | Age: 81
LOS: 1 days | End: 2021-12-20
Payer: MEDICARE

## 2021-12-20 ENCOUNTER — APPOINTMENT (OUTPATIENT)
Dept: CARDIOTHORACIC SURGERY | Facility: CLINIC | Age: 81
End: 2021-12-20
Payer: MEDICARE

## 2021-12-20 VITALS
HEIGHT: 60 IN | TEMPERATURE: 96.2 F | SYSTOLIC BLOOD PRESSURE: 134 MMHG | OXYGEN SATURATION: 98 % | RESPIRATION RATE: 17 BRPM | BODY MASS INDEX: 22.58 KG/M2 | HEART RATE: 52 BPM | DIASTOLIC BLOOD PRESSURE: 63 MMHG | WEIGHT: 115 LBS

## 2021-12-20 VITALS — HEIGHT: 60 IN

## 2021-12-20 DIAGNOSIS — I35.9 NONRHEUMATIC AORTIC VALVE DISORDER, UNSPECIFIED: ICD-10-CM

## 2021-12-20 PROCEDURE — 93306 TTE W/DOPPLER COMPLETE: CPT | Mod: 26

## 2021-12-20 PROCEDURE — 99024 POSTOP FOLLOW-UP VISIT: CPT

## 2021-12-20 PROCEDURE — 93306 TTE W/DOPPLER COMPLETE: CPT

## 2021-12-20 NOTE — REVIEW OF SYSTEMS
[Feeling Fatigued] : feeling fatigued [Dyspnea on exertion] : dyspnea during exertion [Negative] : Psychiatric

## 2021-12-21 NOTE — PHYSICAL EXAM
[No Acute Distress] : no acute distress [Normal Conjunctiva] : normal conjunctiva [Soft] : abdomen soft [Non Tender] : non-tender [No Masses/organomegaly] : no masses/organomegaly [Normal] : alert and oriented, normal memory [de-identified] : III/VI GORDO

## 2021-12-21 NOTE — HISTORY OF PRESENT ILLNESS
[FreeTextEntry1] : 81 year old female with a history of hypertension, hyperlipidemia, Afib (on Eliquis), chronic diastolic heart failure with severe AS s/p BAV on 11/11/2021 who presents for follow up.\par \par Since her last visit, the patient states she has been feeling better.  She goes to the gym once a week where she does the machines and walks on the treadmill.  She does not over exert herself.  She does complain of shortness of breath when she pushes herself, which she does not allow herself to do.  She complains of fatigue.  She denies chest pain, shortness of breath at rest, palpitations, dizziness, syncope, orthopnea, PND, or LE edema.  The patient did not wear the MCOT monitor as instructed.

## 2021-12-22 PROCEDURE — 82803 BLOOD GASES ANY COMBINATION: CPT

## 2021-12-22 PROCEDURE — 93005 ELECTROCARDIOGRAM TRACING: CPT

## 2021-12-22 PROCEDURE — 70450 CT HEAD/BRAIN W/O DYE: CPT

## 2021-12-22 PROCEDURE — C1889: CPT

## 2021-12-22 PROCEDURE — 83036 HEMOGLOBIN GLYCOSYLATED A1C: CPT

## 2021-12-22 PROCEDURE — 85025 COMPLETE CBC W/AUTO DIFF WBC: CPT

## 2021-12-22 PROCEDURE — 80048 BASIC METABOLIC PNL TOTAL CA: CPT

## 2021-12-22 PROCEDURE — 84443 ASSAY THYROID STIM HORMONE: CPT

## 2021-12-22 PROCEDURE — 83735 ASSAY OF MAGNESIUM: CPT

## 2021-12-22 PROCEDURE — C1894: CPT

## 2021-12-22 PROCEDURE — 86923 COMPATIBILITY TEST ELECTRIC: CPT

## 2021-12-22 PROCEDURE — 80053 COMPREHEN METABOLIC PANEL: CPT

## 2021-12-22 PROCEDURE — 86900 BLOOD TYPING SEROLOGIC ABO: CPT

## 2021-12-22 PROCEDURE — 84100 ASSAY OF PHOSPHORUS: CPT

## 2021-12-22 PROCEDURE — 85027 COMPLETE CBC AUTOMATED: CPT

## 2021-12-22 PROCEDURE — 85610 PROTHROMBIN TIME: CPT

## 2021-12-22 PROCEDURE — 83880 ASSAY OF NATRIURETIC PEPTIDE: CPT

## 2021-12-22 PROCEDURE — 84295 ASSAY OF SERUM SODIUM: CPT

## 2021-12-22 PROCEDURE — 85730 THROMBOPLASTIN TIME PARTIAL: CPT

## 2021-12-22 PROCEDURE — 84132 ASSAY OF SERUM POTASSIUM: CPT

## 2021-12-22 PROCEDURE — 74174 CTA ABD&PLVS W/CONTRAST: CPT

## 2021-12-22 PROCEDURE — 86769 SARS-COV-2 COVID-19 ANTIBODY: CPT

## 2021-12-22 PROCEDURE — P9047: CPT

## 2021-12-22 PROCEDURE — 80061 LIPID PANEL: CPT

## 2021-12-22 PROCEDURE — 99285 EMERGENCY DEPT VISIT HI MDM: CPT

## 2021-12-22 PROCEDURE — C1760: CPT

## 2021-12-22 PROCEDURE — 86850 RBC ANTIBODY SCREEN: CPT

## 2021-12-22 PROCEDURE — 84484 ASSAY OF TROPONIN QUANT: CPT

## 2021-12-22 PROCEDURE — 86901 BLOOD TYPING SEROLOGIC RH(D): CPT

## 2021-12-22 PROCEDURE — C1887: CPT

## 2021-12-22 PROCEDURE — 87635 SARS-COV-2 COVID-19 AMP PRB: CPT

## 2021-12-22 PROCEDURE — 36415 COLL VENOUS BLD VENIPUNCTURE: CPT

## 2021-12-22 PROCEDURE — 75573 CT HRT C+ STRUX CGEN HRT DS: CPT

## 2021-12-22 PROCEDURE — 82962 GLUCOSE BLOOD TEST: CPT

## 2021-12-22 PROCEDURE — 93306 TTE W/DOPPLER COMPLETE: CPT

## 2021-12-22 PROCEDURE — 71045 X-RAY EXAM CHEST 1 VIEW: CPT

## 2021-12-22 PROCEDURE — 82330 ASSAY OF CALCIUM: CPT

## 2021-12-22 PROCEDURE — C1769: CPT

## 2021-12-22 PROCEDURE — C1725: CPT

## 2021-12-22 PROCEDURE — 93880 EXTRACRANIAL BILAT STUDY: CPT

## 2022-01-12 ENCOUNTER — OUTPATIENT (OUTPATIENT)
Dept: OUTPATIENT SERVICES | Facility: HOSPITAL | Age: 82
LOS: 1 days | End: 2022-01-12
Payer: MEDICARE

## 2022-01-12 ENCOUNTER — APPOINTMENT (OUTPATIENT)
Dept: CT IMAGING | Facility: HOSPITAL | Age: 82
End: 2022-01-12
Payer: MEDICARE

## 2022-01-12 LAB — POCT ISTAT CREATININE: 0.8 MG/DL — SIGNIFICANT CHANGE UP (ref 0.5–1.3)

## 2022-01-12 PROCEDURE — 82565 ASSAY OF CREATININE: CPT

## 2022-01-12 PROCEDURE — 75573 CT HRT C+ STRUX CGEN HRT DS: CPT | Mod: 26

## 2022-01-12 PROCEDURE — 75573 CT HRT C+ STRUX CGEN HRT DS: CPT

## 2022-02-01 ENCOUNTER — APPOINTMENT (OUTPATIENT)
Dept: CARDIOTHORACIC SURGERY | Facility: HOSPITAL | Age: 82
End: 2022-02-01

## 2022-02-17 ENCOUNTER — NON-APPOINTMENT (OUTPATIENT)
Age: 82
End: 2022-02-17

## 2022-02-22 ENCOUNTER — APPOINTMENT (OUTPATIENT)
Dept: CARDIOTHORACIC SURGERY | Facility: HOSPITAL | Age: 82
End: 2022-02-22

## 2022-02-22 ENCOUNTER — NON-APPOINTMENT (OUTPATIENT)
Age: 82
End: 2022-02-22

## 2022-02-23 VITALS
SYSTOLIC BLOOD PRESSURE: 115 MMHG | TEMPERATURE: 98 F | OXYGEN SATURATION: 95 % | HEIGHT: 60 IN | DIASTOLIC BLOOD PRESSURE: 60 MMHG | HEART RATE: 41 BPM | RESPIRATION RATE: 16 BRPM | WEIGHT: 115.08 LBS

## 2022-02-23 NOTE — PATIENT PROFILE ADULT - MEDICATIONS/VISITS
Patient Education   Patient Education   Patient Education     Hamstring Stretch (with Towel)        To start, lie on your back with your knees bent and feet flat on the floor. Don’t press your neck or lower back to the floor. Breathe deeply. You should feel comfortable and relaxed in this position.  · Put a towel behind one knee or calf.  · Use the towel to pull the leg toward your chest, keeping the leg straight or slightly bent.  · Hold for 30-60 seconds. Then lower the leg.  · Repeat 2 times.  · Switch legs.   For your safety, check with your healthcare provider before starting an exercise program.   © 0114-1061 [employer]. [address] [place] [address]. All rights reserved. This information is not intended as a substitute for professional medical care. Always follow your healthcare professional's instructions.           Hamstring Curls  The following exercise helps build strong, balanced leg muscles. Make sure to adjust exercise machines as instructed by your physical therapist. He or she will tell you how many times to do the exercise.  Note: To prevent injury, always warm up and stretch before your strengthening exercises. Stop any exercise that causes pain. Discuss it with your physical therapist or doctor.  · Lying on your stomach, pull one leg up as far as you comfortably can.  · Let your leg uncurl slowly and steadily.  · Take care not to arch your back.     © 0307-8618 [employer]. [address] [place] [address]. All rights reserved. This information is not intended as a substitute for professional medical care. Always follow your healthcare professional's instructions.           Hamstring Stretch    Begin your rehabilitation with exercises that develop muscle control. These help you meet basic goals, like driving a car or going back to work. Exercise as often as you’re advised. But stop right away if any exercise causes sharp or increasing pain. Icing  your knee for 15 to 20 minutes after exercise can help prevent swelling and soreness.  · Lie on your back with your good knee bent. Put a towel around the back of your injured leg. Tighten your stomach muscles.  · Keeping the knee as straight as you can, slowly pull on the towel to bring your injured leg up. Raise it as far as you comfortably can.  · Hold for 30 to 60 seconds. Repeat 2 to 3 times.   Caution: If you feel tingling or pain in your back or legs, you’re not yet ready for this exercise or are pulling too aggressively.   For your safety, check with your healthcare provider before starting an exercise program.   © 8448-0731 [employer]. [address] [place] [address], [place] [address]. All rights reserved. This information is not intended as a substitute for professional medical care. Always follow your healthcare professional's instructions.            no

## 2022-02-23 NOTE — PATIENT PROFILE ADULT - FALL HARM RISK - HARM RISK INTERVENTIONS

## 2022-02-23 NOTE — PRE-OP CHECKLIST - HEIGHT IN CM
152.4 ROS: No fever/chills, No headache/photophobia/eye pain/changes in vision, No ear pain/sore throat/dysphagia, No chest pain/palpitations, no SOB/cough/wheeze/stridor, No abdominal pain, No N/V/D/melena, no dysuria/frequency/discharge, No neck/back pain, no rash, no changes in neurological status/function.

## 2022-02-24 ENCOUNTER — APPOINTMENT (OUTPATIENT)
Dept: CARDIOTHORACIC SURGERY | Facility: HOSPITAL | Age: 82
End: 2022-02-24

## 2022-02-24 ENCOUNTER — OUTPATIENT (OUTPATIENT)
Dept: INPATIENT UNIT | Facility: HOSPITAL | Age: 82
LOS: 1 days | End: 2022-02-24
Payer: MEDICARE

## 2022-02-24 DIAGNOSIS — Z11.52 ENCOUNTER FOR SCREENING FOR COVID-19: ICD-10-CM

## 2022-02-24 LAB
A1C WITH ESTIMATED AVERAGE GLUCOSE RESULT: 5 % — SIGNIFICANT CHANGE UP (ref 4–5.6)
ALBUMIN SERPL ELPH-MCNC: 4 G/DL — SIGNIFICANT CHANGE UP (ref 3.3–5)
ALP SERPL-CCNC: 89 U/L — SIGNIFICANT CHANGE UP (ref 40–120)
ALT FLD-CCNC: 49 U/L — HIGH (ref 10–45)
ANION GAP SERPL CALC-SCNC: 10 MMOL/L — SIGNIFICANT CHANGE UP (ref 5–17)
APTT BLD: 30.2 SEC — SIGNIFICANT CHANGE UP (ref 27.5–35.5)
AST SERPL-CCNC: 38 U/L — SIGNIFICANT CHANGE UP (ref 10–40)
BILIRUB SERPL-MCNC: 0.4 MG/DL — SIGNIFICANT CHANGE UP (ref 0.2–1.2)
BLD GP AB SCN SERPL QL: NEGATIVE — SIGNIFICANT CHANGE UP
BLD GP AB SCN SERPL QL: NEGATIVE — SIGNIFICANT CHANGE UP
BUN SERPL-MCNC: 25 MG/DL — HIGH (ref 7–23)
CALCIUM SERPL-MCNC: 9.1 MG/DL — SIGNIFICANT CHANGE UP (ref 8.4–10.5)
CHLORIDE SERPL-SCNC: 107 MMOL/L — SIGNIFICANT CHANGE UP (ref 96–108)
CO2 SERPL-SCNC: 23 MMOL/L — SIGNIFICANT CHANGE UP (ref 22–31)
CREAT SERPL-MCNC: 0.97 MG/DL — SIGNIFICANT CHANGE UP (ref 0.5–1.3)
ESTIMATED AVERAGE GLUCOSE: 97 MG/DL — SIGNIFICANT CHANGE UP (ref 68–114)
GLUCOSE SERPL-MCNC: 106 MG/DL — HIGH (ref 70–99)
HCT VFR BLD CALC: 40.2 % — SIGNIFICANT CHANGE UP (ref 34.5–45)
HGB BLD-MCNC: 12.7 G/DL — SIGNIFICANT CHANGE UP (ref 11.5–15.5)
INR BLD: 1.06 — SIGNIFICANT CHANGE UP (ref 0.88–1.16)
MAGNESIUM SERPL-MCNC: 2.4 MG/DL — SIGNIFICANT CHANGE UP (ref 1.6–2.6)
MCHC RBC-ENTMCNC: 29.4 PG — SIGNIFICANT CHANGE UP (ref 27–34)
MCHC RBC-ENTMCNC: 31.6 GM/DL — LOW (ref 32–36)
MCV RBC AUTO: 93.1 FL — SIGNIFICANT CHANGE UP (ref 80–100)
NRBC # BLD: 0 /100 WBCS — SIGNIFICANT CHANGE UP (ref 0–0)
NT-PROBNP SERPL-SCNC: 5005 PG/ML — HIGH (ref 0–300)
PLATELET # BLD AUTO: 215 K/UL — SIGNIFICANT CHANGE UP (ref 150–400)
POTASSIUM SERPL-MCNC: 4.3 MMOL/L — SIGNIFICANT CHANGE UP (ref 3.5–5.3)
POTASSIUM SERPL-SCNC: 4.3 MMOL/L — SIGNIFICANT CHANGE UP (ref 3.5–5.3)
PROT SERPL-MCNC: 6.7 G/DL — SIGNIFICANT CHANGE UP (ref 6–8.3)
PROTHROM AB SERPL-ACNC: 12.6 SEC — SIGNIFICANT CHANGE UP (ref 10.5–13.4)
RBC # BLD: 4.32 M/UL — SIGNIFICANT CHANGE UP (ref 3.8–5.2)
RBC # FLD: 15.7 % — HIGH (ref 10.3–14.5)
RH IG SCN BLD-IMP: POSITIVE — SIGNIFICANT CHANGE UP
RH IG SCN BLD-IMP: POSITIVE — SIGNIFICANT CHANGE UP
SODIUM SERPL-SCNC: 140 MMOL/L — SIGNIFICANT CHANGE UP (ref 135–145)
TSH SERPL-MCNC: 2.55 UIU/ML — SIGNIFICANT CHANGE UP (ref 0.27–4.2)
WBC # BLD: 7.22 K/UL — SIGNIFICANT CHANGE UP (ref 3.8–10.5)
WBC # FLD AUTO: 7.22 K/UL — SIGNIFICANT CHANGE UP (ref 3.8–10.5)

## 2022-02-24 PROCEDURE — 85027 COMPLETE CBC AUTOMATED: CPT

## 2022-02-24 PROCEDURE — 85610 PROTHROMBIN TIME: CPT

## 2022-02-24 PROCEDURE — 83735 ASSAY OF MAGNESIUM: CPT

## 2022-02-24 PROCEDURE — 36415 COLL VENOUS BLD VENIPUNCTURE: CPT

## 2022-02-24 PROCEDURE — 86901 BLOOD TYPING SEROLOGIC RH(D): CPT

## 2022-02-24 PROCEDURE — 85730 THROMBOPLASTIN TIME PARTIAL: CPT

## 2022-02-24 PROCEDURE — 80053 COMPREHEN METABOLIC PANEL: CPT

## 2022-02-24 PROCEDURE — 84443 ASSAY THYROID STIM HORMONE: CPT

## 2022-02-24 PROCEDURE — 83880 ASSAY OF NATRIURETIC PEPTIDE: CPT

## 2022-02-24 PROCEDURE — 86900 BLOOD TYPING SEROLOGIC ABO: CPT

## 2022-02-24 PROCEDURE — 83036 HEMOGLOBIN GLYCOSYLATED A1C: CPT

## 2022-02-24 PROCEDURE — 86850 RBC ANTIBODY SCREEN: CPT

## 2022-03-30 PROBLEM — E78.5 HYPERLIPIDEMIA, UNSPECIFIED: Chronic | Status: ACTIVE | Noted: 2022-02-23

## 2022-03-30 PROBLEM — C85.90 NON-HODGKIN LYMPHOMA, UNSPECIFIED, UNSPECIFIED SITE: Chronic | Status: ACTIVE | Noted: 2022-02-23

## 2022-04-04 ENCOUNTER — APPOINTMENT (OUTPATIENT)
Dept: CARDIOTHORACIC SURGERY | Facility: CLINIC | Age: 82
End: 2022-04-04
Payer: MEDICARE

## 2022-04-04 VITALS
HEIGHT: 60 IN | TEMPERATURE: 96.8 F | WEIGHT: 112 LBS | HEART RATE: 54 BPM | RESPIRATION RATE: 17 BRPM | BODY MASS INDEX: 21.99 KG/M2 | SYSTOLIC BLOOD PRESSURE: 124 MMHG | DIASTOLIC BLOOD PRESSURE: 60 MMHG | OXYGEN SATURATION: 92 %

## 2022-04-04 PROCEDURE — 99214 OFFICE O/P EST MOD 30 MIN: CPT

## 2022-04-05 NOTE — REVIEW OF SYSTEMS
[Dyspnea on exertion] : dyspnea during exertion [Negative] : Psychiatric [Feeling Fatigued] : feeling fatigued [Dizziness] : dizziness

## 2022-04-09 ENCOUNTER — LABORATORY RESULT (OUTPATIENT)
Age: 82
End: 2022-04-09

## 2022-04-11 ENCOUNTER — TRANSCRIPTION ENCOUNTER (OUTPATIENT)
Age: 82
End: 2022-04-11

## 2022-04-11 VITALS
DIASTOLIC BLOOD PRESSURE: 90 MMHG | HEART RATE: 88 BPM | OXYGEN SATURATION: 98 % | SYSTOLIC BLOOD PRESSURE: 127 MMHG | TEMPERATURE: 98 F | HEIGHT: 63 IN | RESPIRATION RATE: 16 BRPM | WEIGHT: 125 LBS

## 2022-04-11 NOTE — PATIENT PROFILE ADULT - FALL HARM RISK - UNIVERSAL INTERVENTIONS
Bed in lowest position, wheels locked, appropriate side rails in place/Call bell, personal items and telephone in reach/Instruct patient to call for assistance before getting out of bed or chair/Non-slip footwear when patient is out of bed/[place] to call system/Physically safe environment - no spills, clutter or unnecessary equipment/Purposeful Proactive Rounding/Room/bathroom lighting operational, light cord in reach

## 2022-04-12 ENCOUNTER — NON-APPOINTMENT (OUTPATIENT)
Age: 82
End: 2022-04-12

## 2022-04-12 ENCOUNTER — APPOINTMENT (OUTPATIENT)
Dept: CARDIOTHORACIC SURGERY | Facility: HOSPITAL | Age: 82
End: 2022-04-12

## 2022-04-12 ENCOUNTER — APPOINTMENT (OUTPATIENT)
Dept: CARDIOTHORACIC SURGERY | Facility: CLINIC | Age: 82
End: 2022-04-12
Payer: MEDICARE

## 2022-04-12 ENCOUNTER — INPATIENT (INPATIENT)
Facility: HOSPITAL | Age: 82
LOS: 1 days | Discharge: HOME | DRG: 267 | End: 2022-04-14
Attending: INTERNAL MEDICINE | Admitting: INTERNAL MEDICINE
Payer: MEDICARE

## 2022-04-12 DIAGNOSIS — Z98.890 OTHER SPECIFIED POSTPROCEDURAL STATES: Chronic | ICD-10-CM

## 2022-04-12 LAB
ALBUMIN SERPL ELPH-MCNC: 3.7 G/DL — SIGNIFICANT CHANGE UP (ref 3.3–5)
ALP SERPL-CCNC: 71 U/L — SIGNIFICANT CHANGE UP (ref 40–120)
ALT FLD-CCNC: 12 U/L — SIGNIFICANT CHANGE UP (ref 10–45)
ANION GAP SERPL CALC-SCNC: 10 MMOL/L — SIGNIFICANT CHANGE UP (ref 5–17)
ANION GAP SERPL CALC-SCNC: 11 MMOL/L — SIGNIFICANT CHANGE UP (ref 5–17)
APTT BLD: 31.2 SEC — SIGNIFICANT CHANGE UP (ref 27.5–35.5)
APTT BLD: 31.3 SEC — SIGNIFICANT CHANGE UP (ref 27.5–35.5)
AST SERPL-CCNC: 22 U/L — SIGNIFICANT CHANGE UP (ref 10–40)
BASOPHILS # BLD AUTO: 0.04 K/UL — SIGNIFICANT CHANGE UP (ref 0–0.2)
BASOPHILS NFR BLD AUTO: 0.5 % — SIGNIFICANT CHANGE UP (ref 0–2)
BILIRUB SERPL-MCNC: 0.5 MG/DL — SIGNIFICANT CHANGE UP (ref 0.2–1.2)
BLD GP AB SCN SERPL QL: NEGATIVE — SIGNIFICANT CHANGE UP
BUN SERPL-MCNC: 15 MG/DL — SIGNIFICANT CHANGE UP (ref 7–23)
BUN SERPL-MCNC: 16 MG/DL — SIGNIFICANT CHANGE UP (ref 7–23)
CALCIUM SERPL-MCNC: 8.8 MG/DL — SIGNIFICANT CHANGE UP (ref 8.4–10.5)
CALCIUM SERPL-MCNC: 9.3 MG/DL — SIGNIFICANT CHANGE UP (ref 8.4–10.5)
CHLORIDE SERPL-SCNC: 103 MMOL/L — SIGNIFICANT CHANGE UP (ref 96–108)
CHLORIDE SERPL-SCNC: 105 MMOL/L — SIGNIFICANT CHANGE UP (ref 96–108)
CO2 SERPL-SCNC: 22 MMOL/L — SIGNIFICANT CHANGE UP (ref 22–31)
CO2 SERPL-SCNC: 26 MMOL/L — SIGNIFICANT CHANGE UP (ref 22–31)
CREAT SERPL-MCNC: 0.78 MG/DL — SIGNIFICANT CHANGE UP (ref 0.5–1.3)
CREAT SERPL-MCNC: 0.86 MG/DL — SIGNIFICANT CHANGE UP (ref 0.5–1.3)
EGFR: 67 ML/MIN/1.73M2 — SIGNIFICANT CHANGE UP
EGFR: 76 ML/MIN/1.73M2 — SIGNIFICANT CHANGE UP
EOSINOPHIL # BLD AUTO: 0.14 K/UL — SIGNIFICANT CHANGE UP (ref 0–0.5)
EOSINOPHIL NFR BLD AUTO: 1.6 % — SIGNIFICANT CHANGE UP (ref 0–6)
FIBRINOGEN PPP-MCNC: 304 MG/DL — SIGNIFICANT CHANGE UP (ref 258–438)
GAS PNL BLDA: SIGNIFICANT CHANGE UP
GAS PNL BLDA: SIGNIFICANT CHANGE UP
GLUCOSE SERPL-MCNC: 108 MG/DL — HIGH (ref 70–99)
GLUCOSE SERPL-MCNC: 89 MG/DL — SIGNIFICANT CHANGE UP (ref 70–99)
HCT VFR BLD CALC: 42 % — SIGNIFICANT CHANGE UP (ref 34.5–45)
HCT VFR BLD CALC: 47.1 % — HIGH (ref 34.5–45)
HGB BLD-MCNC: 14 G/DL — SIGNIFICANT CHANGE UP (ref 11.5–15.5)
HGB BLD-MCNC: 15 G/DL — SIGNIFICANT CHANGE UP (ref 11.5–15.5)
IMM GRANULOCYTES NFR BLD AUTO: 0.5 % — SIGNIFICANT CHANGE UP (ref 0–1.5)
INR BLD: 1.02 — SIGNIFICANT CHANGE UP (ref 0.88–1.16)
INR BLD: 1.13 — SIGNIFICANT CHANGE UP (ref 0.88–1.16)
LYMPHOCYTES # BLD AUTO: 1.42 K/UL — SIGNIFICANT CHANGE UP (ref 1–3.3)
LYMPHOCYTES # BLD AUTO: 16.6 % — SIGNIFICANT CHANGE UP (ref 13–44)
MCHC RBC-ENTMCNC: 29.5 PG — SIGNIFICANT CHANGE UP (ref 27–34)
MCHC RBC-ENTMCNC: 30.8 PG — SIGNIFICANT CHANGE UP (ref 27–34)
MCHC RBC-ENTMCNC: 31.8 GM/DL — LOW (ref 32–36)
MCHC RBC-ENTMCNC: 33.3 GM/DL — SIGNIFICANT CHANGE UP (ref 32–36)
MCV RBC AUTO: 92.5 FL — SIGNIFICANT CHANGE UP (ref 80–100)
MCV RBC AUTO: 92.5 FL — SIGNIFICANT CHANGE UP (ref 80–100)
MONOCYTES # BLD AUTO: 0.45 K/UL — SIGNIFICANT CHANGE UP (ref 0–0.9)
MONOCYTES NFR BLD AUTO: 5.3 % — SIGNIFICANT CHANGE UP (ref 2–14)
NEUTROPHILS # BLD AUTO: 6.44 K/UL — SIGNIFICANT CHANGE UP (ref 1.8–7.4)
NEUTROPHILS NFR BLD AUTO: 75.5 % — SIGNIFICANT CHANGE UP (ref 43–77)
NRBC # BLD: 0 /100 WBCS — SIGNIFICANT CHANGE UP (ref 0–0)
NRBC # BLD: 0 /100 WBCS — SIGNIFICANT CHANGE UP (ref 0–0)
PLATELET # BLD AUTO: 197 K/UL — SIGNIFICANT CHANGE UP (ref 150–400)
PLATELET # BLD AUTO: 226 K/UL — SIGNIFICANT CHANGE UP (ref 150–400)
POTASSIUM SERPL-MCNC: 4.3 MMOL/L — SIGNIFICANT CHANGE UP (ref 3.5–5.3)
POTASSIUM SERPL-MCNC: 4.4 MMOL/L — SIGNIFICANT CHANGE UP (ref 3.5–5.3)
POTASSIUM SERPL-SCNC: 4.3 MMOL/L — SIGNIFICANT CHANGE UP (ref 3.5–5.3)
POTASSIUM SERPL-SCNC: 4.4 MMOL/L — SIGNIFICANT CHANGE UP (ref 3.5–5.3)
PROT SERPL-MCNC: 6.2 G/DL — SIGNIFICANT CHANGE UP (ref 6–8.3)
PROTHROM AB SERPL-ACNC: 12.2 SEC — SIGNIFICANT CHANGE UP (ref 10.5–13.4)
PROTHROM AB SERPL-ACNC: 13.5 SEC — HIGH (ref 10.5–13.4)
RBC # BLD: 4.54 M/UL — SIGNIFICANT CHANGE UP (ref 3.8–5.2)
RBC # BLD: 5.09 M/UL — SIGNIFICANT CHANGE UP (ref 3.8–5.2)
RBC # FLD: 13.9 % — SIGNIFICANT CHANGE UP (ref 10.3–14.5)
RBC # FLD: 13.9 % — SIGNIFICANT CHANGE UP (ref 10.3–14.5)
RH IG SCN BLD-IMP: POSITIVE — SIGNIFICANT CHANGE UP
SODIUM SERPL-SCNC: 138 MMOL/L — SIGNIFICANT CHANGE UP (ref 135–145)
SODIUM SERPL-SCNC: 139 MMOL/L — SIGNIFICANT CHANGE UP (ref 135–145)
WBC # BLD: 6.29 K/UL — SIGNIFICANT CHANGE UP (ref 3.8–10.5)
WBC # BLD: 8.53 K/UL — SIGNIFICANT CHANGE UP (ref 3.8–10.5)
WBC # FLD AUTO: 6.29 K/UL — SIGNIFICANT CHANGE UP (ref 3.8–10.5)
WBC # FLD AUTO: 8.53 K/UL — SIGNIFICANT CHANGE UP (ref 3.8–10.5)

## 2022-04-12 PROCEDURE — 33361 REPLACE AORTIC VALVE PERQ: CPT | Mod: Q0,62

## 2022-04-12 PROCEDURE — 33210 INSERT ELECTRD/PM CATH SNGL: CPT

## 2022-04-12 PROCEDURE — 99291 CRITICAL CARE FIRST HOUR: CPT | Mod: 25

## 2022-04-12 PROCEDURE — MCOT1: CPT | Mod: NC

## 2022-04-12 PROCEDURE — 71045 X-RAY EXAM CHEST 1 VIEW: CPT | Mod: 26,76

## 2022-04-12 PROCEDURE — 99292 CRITICAL CARE ADDL 30 MIN: CPT | Mod: 25

## 2022-04-12 PROCEDURE — 33370 TCAT PLMT&RMVL CEPD PERQ: CPT

## 2022-04-12 PROCEDURE — 36556 INSERT NON-TUNNEL CV CATH: CPT | Mod: 59

## 2022-04-12 DEVICE — KIT PACE ELCTR BIPOLAR 5FR: Type: IMPLANTABLE DEVICE | Status: FUNCTIONAL

## 2022-04-12 DEVICE — SHEATH INTRODUCER TERUMO PINNACLE CORONARY 8FR X 10CM X 0.038" MINI WIRE: Type: IMPLANTABLE DEVICE | Status: FUNCTIONAL

## 2022-04-12 DEVICE — CATH BLLN TRU FLOW 20MMX3.5CM: Type: IMPLANTABLE DEVICE | Status: FUNCTIONAL

## 2022-04-12 DEVICE — WIRE ASAHI GRAND SLAM 300CM: Type: IMPLANTABLE DEVICE | Status: FUNCTIONAL

## 2022-04-12 DEVICE — GUIDEWIRE STANDARD STRAIGHT .035" X 180CM: Type: IMPLANTABLE DEVICE | Status: FUNCTIONAL

## 2022-04-12 DEVICE — GWIRE GUID  0.035INX150CM: Type: IMPLANTABLE DEVICE | Status: FUNCTIONAL

## 2022-04-12 DEVICE — SUT PERCLOSE PROGLIDE 6FR: Type: IMPLANTABLE DEVICE | Status: FUNCTIONAL

## 2022-04-12 DEVICE — INTRODUCER SHEATH KIT GLIDESHEATH SLENDER FLEX STRAIGHT 21G 6F X 10CM: Type: IMPLANTABLE DEVICE | Status: FUNCTIONAL

## 2022-04-12 DEVICE — INTRODUCER SHEATH 0.038" X 14FR X 30CM: Type: IMPLANTABLE DEVICE | Status: FUNCTIONAL

## 2022-04-12 DEVICE — INTRO MICROPUNC 4FRX10CM SS: Type: IMPLANTABLE DEVICE | Status: FUNCTIONAL

## 2022-04-12 DEVICE — SHEATH INTRODUCER TERUMO PINNACLE CORONARY 5FR X 10CM X 0.038" MINI WIRE: Type: IMPLANTABLE DEVICE | Status: FUNCTIONAL

## 2022-04-12 DEVICE — CATH DX AL1 INFIN 5FRX100CM: Type: IMPLANTABLE DEVICE | Status: FUNCTIONAL

## 2022-04-12 DEVICE — SYS CEREBRAL PROT NCT04149535 SENTINEL FOR STUDY ONLY: Type: IMPLANTABLE DEVICE | Status: FUNCTIONAL

## 2022-04-12 DEVICE — GUIDEWIRE LUNDERQUIST EXTRA-STIFF DOUBLE CURVED .035" X 260CM: Type: IMPLANTABLE DEVICE | Status: FUNCTIONAL

## 2022-04-12 DEVICE — GUIDEWIRE TERUMO GLIDEWIRE STIFF STRAGHT .035" X 180CM: Type: IMPLANTABLE DEVICE | Status: FUNCTIONAL

## 2022-04-12 DEVICE — CATH DX PIG 145 INFIN 5FRX110CM: Type: IMPLANTABLE DEVICE | Status: FUNCTIONAL

## 2022-04-12 DEVICE — VALVE TAVR EVOLUT PROPLUS 29MM: Type: IMPLANTABLE DEVICE | Status: FUNCTIONAL

## 2022-04-12 DEVICE — EMERALD GUIDEWIRE 0.35: Type: IMPLANTABLE DEVICE | Status: FUNCTIONAL

## 2022-04-12 DEVICE — INTRO GWIRE: Type: IMPLANTABLE DEVICE | Status: FUNCTIONAL

## 2022-04-12 RX ORDER — PANTOPRAZOLE SODIUM 20 MG/1
40 TABLET, DELAYED RELEASE ORAL
Refills: 0 | Status: DISCONTINUED | OUTPATIENT
Start: 2022-04-12 | End: 2022-04-14

## 2022-04-12 RX ORDER — SODIUM CHLORIDE 9 MG/ML
1000 INJECTION INTRAMUSCULAR; INTRAVENOUS; SUBCUTANEOUS
Refills: 0 | Status: DISCONTINUED | OUTPATIENT
Start: 2022-04-12 | End: 2022-04-14

## 2022-04-12 RX ORDER — ALBUMIN HUMAN 25 %
250 VIAL (ML) INTRAVENOUS ONCE
Refills: 0 | Status: COMPLETED | OUTPATIENT
Start: 2022-04-12 | End: 2022-04-12

## 2022-04-12 RX ORDER — INSULIN HUMAN 100 [IU]/ML
2 INJECTION, SOLUTION SUBCUTANEOUS
Qty: 50 | Refills: 0 | Status: DISCONTINUED | OUTPATIENT
Start: 2022-04-12 | End: 2022-04-12

## 2022-04-12 RX ORDER — ATORVASTATIN CALCIUM 80 MG/1
40 TABLET, FILM COATED ORAL AT BEDTIME
Refills: 0 | Status: DISCONTINUED | OUTPATIENT
Start: 2022-04-12 | End: 2022-04-14

## 2022-04-12 RX ORDER — ASPIRIN/CALCIUM CARB/MAGNESIUM 324 MG
81 TABLET ORAL ONCE
Refills: 0 | Status: COMPLETED | OUTPATIENT
Start: 2022-04-12 | End: 2022-04-12

## 2022-04-12 RX ORDER — CEFAZOLIN SODIUM 1 G
2000 VIAL (EA) INJECTION EVERY 8 HOURS
Refills: 0 | Status: COMPLETED | OUTPATIENT
Start: 2022-04-12 | End: 2022-04-14

## 2022-04-12 RX ORDER — DEXMEDETOMIDINE HYDROCHLORIDE IN 0.9% SODIUM CHLORIDE 4 UG/ML
0.2 INJECTION INTRAVENOUS
Qty: 400 | Refills: 0 | Status: DISCONTINUED | OUTPATIENT
Start: 2022-04-12 | End: 2022-04-14

## 2022-04-12 RX ORDER — MIDAZOLAM HYDROCHLORIDE 1 MG/ML
1 INJECTION, SOLUTION INTRAMUSCULAR; INTRAVENOUS ONCE
Refills: 0 | Status: DISCONTINUED | OUTPATIENT
Start: 2022-04-12 | End: 2022-04-12

## 2022-04-12 RX ORDER — PHENYLEPHRINE HYDROCHLORIDE 10 MG/ML
0.1 INJECTION INTRAVENOUS
Qty: 40 | Refills: 0 | Status: DISCONTINUED | OUTPATIENT
Start: 2022-04-12 | End: 2022-04-14

## 2022-04-12 RX ORDER — DEXTROSE 50 % IN WATER 50 %
25 SYRINGE (ML) INTRAVENOUS
Refills: 0 | Status: DISCONTINUED | OUTPATIENT
Start: 2022-04-12 | End: 2022-04-12

## 2022-04-12 RX ORDER — CHLORHEXIDINE GLUCONATE 213 G/1000ML
1 SOLUTION TOPICAL DAILY
Refills: 0 | Status: DISCONTINUED | OUTPATIENT
Start: 2022-04-12 | End: 2022-04-14

## 2022-04-12 RX ORDER — PANTOPRAZOLE SODIUM 20 MG/1
40 TABLET, DELAYED RELEASE ORAL DAILY
Refills: 0 | Status: DISCONTINUED | OUTPATIENT
Start: 2022-04-12 | End: 2022-04-12

## 2022-04-12 RX ORDER — HEPARIN SODIUM 5000 [USP'U]/ML
5000 INJECTION INTRAVENOUS; SUBCUTANEOUS EVERY 8 HOURS
Refills: 0 | Status: DISCONTINUED | OUTPATIENT
Start: 2022-04-12 | End: 2022-04-14

## 2022-04-12 RX ORDER — DEXTROSE 50 % IN WATER 50 %
50 SYRINGE (ML) INTRAVENOUS
Refills: 0 | Status: DISCONTINUED | OUTPATIENT
Start: 2022-04-12 | End: 2022-04-12

## 2022-04-12 RX ORDER — ACETAMINOPHEN 500 MG
650 TABLET ORAL EVERY 6 HOURS
Refills: 0 | Status: DISCONTINUED | OUTPATIENT
Start: 2022-04-12 | End: 2022-04-14

## 2022-04-12 RX ADMIN — HEPARIN SODIUM 5000 UNIT(S): 5000 INJECTION INTRAVENOUS; SUBCUTANEOUS at 22:51

## 2022-04-12 RX ADMIN — Medication 500 MILLILITER(S): at 16:55

## 2022-04-12 RX ADMIN — PHENYLEPHRINE HYDROCHLORIDE 2.13 MICROGRAM(S)/KG/MIN: 10 INJECTION INTRAVENOUS at 18:09

## 2022-04-12 RX ADMIN — Medication 81 MILLIGRAM(S): at 10:37

## 2022-04-12 RX ADMIN — Medication 100 MILLIGRAM(S): at 22:51

## 2022-04-12 RX ADMIN — DEXMEDETOMIDINE HYDROCHLORIDE IN 0.9% SODIUM CHLORIDE 2.84 MICROGRAM(S)/KG/HR: 4 INJECTION INTRAVENOUS at 18:10

## 2022-04-12 RX ADMIN — MIDAZOLAM HYDROCHLORIDE 1 MILLIGRAM(S): 1 INJECTION, SOLUTION INTRAMUSCULAR; INTRAVENOUS at 22:30

## 2022-04-12 RX ADMIN — MIDAZOLAM HYDROCHLORIDE 1 MILLIGRAM(S): 1 INJECTION, SOLUTION INTRAMUSCULAR; INTRAVENOUS at 17:14

## 2022-04-12 NOTE — H&P ADULT - ASSESSMENT
Assessment: 81F with history of aortic stenosis and AF on apixiban presenting for TAVR (corevalve)    Admit under Dr. Hernández  via same day surgery. Consent signed, placed on chart.  Risks/benefits reviewed, patient understands and agrees. T&S ordered and blood products placed on hold for OR.  To 9  post-op.  Assessment: 81F with history of aortic stenosis and AF on apixiban presenting for TAVR (corevalve).    Admit under Dr. Hernández  via same day surgery. Consent signed, placed on chart.  Risks/benefits reviewed, patient understands and agrees. T&S ordered and blood products placed on hold for OR.  To 9  post-op.   Preop aspirin 81mg load.

## 2022-04-12 NOTE — CONSULT NOTE ADULT - SUBJECTIVE AND OBJECTIVE BOX
Electrophysiology Consult Note:     CHIEF COMPLAINT:  Patient is a 81y old  Female who presents with a chief complaint of TAVR (12 Apr 2022 15:21)    HISTORY OF PRESENT ILLNESS:   HPI:  81 year old female with a history of hypertension, hyperlipidemia, Afib (on Eliquis), chronic diastolic heart failure with severe AS s/p BAV on 11/11/2021 who presents for scheduled TAVR.  Patient had TAVR with Christoph valve today, she had transient episode of HB during procedure.  EPS called to evaluate.         PAST MEDICAL & SURGICAL HISTORY:  Atrial fibrillation    Aortic stenosis    Lymphoma    Hyperlipidemia    H/O aortic valvuloplasty  11/2021        FAMILY HISTORY:      SOCIAL HISTORY:    [x ] Non-smoker      Allergies    No Known Allergies    Intolerances    	    MEDICATIONS:  MEDICATIONS  (STANDING):  atorvastatin 40 milliGRAM(s) Oral at bedtime  ceFAZolin   IVPB 2000 milliGRAM(s) IV Intermittent every 8 hours  chlorhexidine 2% Cloths 1 Application(s) Topical daily  dextrose 50% Injectable 50 milliLiter(s) IV Push every 15 minutes  dextrose 50% Injectable 25 milliLiter(s) IV Push every 15 minutes  heparin   Injectable 5000 Unit(s) SubCutaneous every 8 hours  insulin regular Infusion 2 Unit(s)/Hr (2 mL/Hr) IV Continuous <Continuous>  pantoprazole  Injectable 40 milliGRAM(s) IV Push daily  sodium chloride 0.9%. 1000 milliLiter(s) (10 mL/Hr) IV Continuous <Continuous>    MEDICATIONS  (PRN):        REVIEW OF SYSTEMS:  CONSTITUTIONAL: No fever, weight loss, or fatigue  EYES: No eye pain, visual disturbances, or discharge  ENMT:  No difficulty hearing, tinnitus, vertigo; No sinus or throat pain  NECK: No pain or stiffness  BREASTS: No pain, masses, or nipple discharge  RESPIRATORY: No cough, wheezing, chills or hemoptysis; No Shortness of Breath  CARDIOVASCULAR: No chest pain, palpitations, dizziness, or leg swelling  GASTROINTESTINAL: No abdominal or epigastric pain. No nausea, vomiting, or hematemesis; No diarrhea or constipation.  GENITOURINARY: No dysuria, frequency, hematuria, or incontinence  NEUROLOGICAL: No headaches, memory loss, loss of strength, numbness, or tremors  SKIN: No itching, burning, rashes, or lesions   LYMPH Nodes: No enlarged glands      PHYSICAL EXAM:  Vital Signs Last 24 Hrs  T(C): 35.8 (12 Apr 2022 16:57), Max: 35.8 (12 Apr 2022 16:57)  T(F): 96.5 (12 Apr 2022 16:57), Max: 96.5 (12 Apr 2022 16:57)  HR: 66 (12 Apr 2022 17:00) (66 - 78)  BP: 90/42 (12 Apr 2022 17:00) (90/42 - 139/70)  BP(mean): 60 (12 Apr 2022 17:00) (60 - 92)  RR: 20 (12 Apr 2022 17:00) (18 - 23)  SpO2: 100% (12 Apr 2022 17:00) (97% - 100%)  Daily     Daily     Constitutional: NAD	  HEENT:   PERRL, EOMI	  CVS:  S1 S2, No JVD,  No edema  Pulm: Lungs clear to auscultation	  GI:  Soft, Non-tender, + BS	  Ext: No LE edema  Neurologic: A&O x 3  Skin: No rash or lesion       	  LABS:	                         14.0   8.53  )-----------( 197      ( 12 Apr 2022 15:31 )             42.0     04-12    138  |  105  |  15  ----------------------------<  108<H>  4.4   |  22  |  0.78    Ca    8.8      12 Apr 2022 15:31    TPro  6.2  /  Alb  3.7  /  TBili  0.5  /  DBili  x   /  AST  22  /  ALT  12  /  AlkPhos  71  04-12    proBNP:   Lipid Profile:   HgA1c:   TSH: 	      EKG: < from: 12 Lead ECG (11.09.21 @ 14:16) >  Ventricular Rate 74 BPM    Atrial Rate 74 BPM    P-R Interval 150 ms    QRS Duration 106 ms    Q-T Interval 456 ms    QTC Calculation(Bazett) 506 ms    P Axis 82 degrees    R Axis -69 degrees    T Axis 103 degrees    Diagnosis Line Sinus rhythm with frequent premature ventricular complexes in a pattern of bigeminy  Left anterior fascicular block  Septal infarct , age undetermined  ST - T  abnormalities suggest ischemia      Telemetry: sinus rhythm with bigeminy     Echo: < from: TTE Echo Complete w/o Contrast w/ Doppler (11.12.21 @ 10:28) >  1. Normal leftventricular size and systolic function.   2. Normal right ventricular size and systolic function.   3. Dilated left atrium.   4. Mild-to-moderate aortic regurgitation.   5. Severe aortic stenosis. The gradients are decreased from prior study.   6. Mild-to-moderate mitral regurgitation.   7. Trivial pericardial effusion.   8. Left pleural effusion.   9. Compared to the previous TTE performed on 11/10/2021, aortic stenosis is severe with a mild decrease in peak and mean gradients.

## 2022-04-12 NOTE — PROGRESS NOTE ADULT - SUBJECTIVE AND OBJECTIVE BOX
Patient discussed on morning rounds with Dr. Gonzalez     Operation / Date:  4/12/22 -- TAVR Corevalve     SUBJECTIVE ASSESSMENT:  Pt is sleepy post-procedure and did not want to answer questions but appears comfortable.     Vital Signs Last 24 Hrs  T(C): --  T(F): --  HR: 77 (12 Apr 2022 15:07) (77 - 77)  BP: --  BP(mean): --  RR: 18 (12 Apr 2022 15:07) (18 - 18)  SpO2: 97% (12 Apr 2022 15:07) (97% - 97%)  I&O's Detail      CHEST TUBE:  no  KARLA DRAIN:  No.  EPICARDIAL WIRES: No.  TIE DOWNS: No.  PICKENS: No.  TVP: YES - left IJ     PHYSICAL EXAM:  General: resting in bed in NAD   Neurological: AOx3. Motor skills grossly intact  Cardiovascular: Normal S1/S2. Regular rate/rhythm. +murmur  Respiratory: Lungs CTA bilaterally. No wheezing or rales  Gastrointestinal: +BS in all 4 quadrants. Non-distended. Soft. Non-tender  Extremities: Strength 5/5 b/l upper/lower extremities. Sensation grossly intact upper/lower extremities. No edema. No calf tenderness.  Vascular: Radial 2+bilaterally, DP 2+ b/l  Incision Sites: b/l groin incisions with dressing in place c/d/i.,     LABS:                        15.0   6.29  )-----------( 226      ( 12 Apr 2022 10:03 )             47.1     COUMADIN:  no    PT/INR - ( 12 Apr 2022 10:03 )   PT: 12.2 sec;   INR: 1.02     PTT - ( 12 Apr 2022 10:03 )  PTT:31.2 sec    04-12    139  |  103  |  16  ----------------------------<  89  4.3   |  26  |  0.86    Ca    9.3      12 Apr 2022 10:03    MEDICATIONS  (STANDING):  atorvastatin 40 milliGRAM(s) Oral at bedtime  ceFAZolin   IVPB 2000 milliGRAM(s) IV Intermittent every 8 hours  chlorhexidine 2% Cloths 1 Application(s) Topical daily  dextrose 50% Injectable 50 milliLiter(s) IV Push every 15 minutes  dextrose 50% Injectable 25 milliLiter(s) IV Push every 15 minutes  heparin   Injectable 5000 Unit(s) SubCutaneous every 8 hours  insulin regular Infusion 2 Unit(s)/Hr (2 mL/Hr) IV Continuous <Continuous>  pantoprazole  Injectable 40 milliGRAM(s) IV Push daily  sodium chloride 0.9%. 1000 milliLiter(s) (10 mL/Hr) IV Continuous <Continuous>    MEDICATIONS  (PRN):    RADIOLOGY & ADDITIONAL TESTS:  pending post-operative CXR, follow up

## 2022-04-12 NOTE — PROGRESS NOTE ADULT - SUBJECTIVE AND OBJECTIVE BOX
CTICU  CRITICAL  CARE  attending     Hand off received 					   Pertinent clinical, laboratory, radiographic, hemodynamic, echocardiographic, respiratory data, microbiologic data and chart were reviewed and analyzed frequently throughout the course of the day and night  Patient seen and examined with CTS/ SH attending at bedside    Pt is a 81y , Female, with hx of severe AS; s/p BAV in 11/21; today s/p TF-TAVR ( vlad valve);     transient heart block; intra procedure    post procedure:    agitated  pulled out her left Cordis/TVP  emergent left SC intro and TVP placed with good capture  on precedex for agitation  Neosynephrine to maintain MAP >70    rhythm is sinus with frequent PVCs  qrs 106 msec    being followed by EPS    HPI:    81 year old female with a history of hypertension, hyperlipidemia, Afib (on Eliquis), chronic diastolic heart failure with severe AS s/p BAV on 11/11/2021 who presents for follow up.    Since her last visit, the patient had canceled her TAVR procedure multiple times. The patient agrees to proceed at this time. She states she has been feeling some dizziness and some shortness of breath. She denies chest pain, shortness of breath at rest, syncope, orthopnea, PND, or LE edema. She states that she is unable to do her daily gym routine every day that she used to be able to do. She believes her symptoms are from aging and not from her valve.     Last dose eliquis 4/9.  Not taking aspirin. Patient seen in same day holding area; Reports no changes to PMHx or medications since last seen by our team. Denies acute or current SOB, chest pain, palpitation, N/V/D, fever/chills, recent illness, or any other concerning symptoms.         , FAMILY HISTORY:  PAST MEDICAL & SURGICAL HISTORY:  Atrial fibrillation    Aortic stenosis    Lymphoma    Hyperlipidemia    H/O aortic valvuloplasty  11/2021      Patient is a 81y old  Female who presents with a chief complaint of severe AS (12 Apr 2022 17:26)      14 system review limited 2/2 delirium     Vital signs, hemodynamic and respiratory parameters were reviewed from the bedside nursing flowsheet.  ICU Vital Signs Last 24 Hrs  T(C): 36.2 (12 Apr 2022 21:54), Max: 36.2 (12 Apr 2022 21:54)  T(F): 97.2 (12 Apr 2022 21:54), Max: 97.2 (12 Apr 2022 21:54)  HR: 65 (13 Apr 2022 00:00) (62 - 79)  BP: 136/63 (12 Apr 2022 23:00) (90/42 - 139/70)  BP(mean): 91 (12 Apr 2022 23:00) (60 - 92)  ABP: 146/73 (13 Apr 2022 00:00) (84/25 - 173/74)  ABP(mean): 101 (13 Apr 2022 00:00) (44 - 115)  RR: 18 (13 Apr 2022 01:00) (18 - 23)  SpO2: 100% (13 Apr 2022 00:00) (97% - 100%)    Adult Advanced Hemodynamics Last 24 Hrs  CVP(mm Hg): --  CVP(cm H2O): --  CO: --  CI: --  PA: --  PA(mean): --  PCWP: --  SVR: --  SVRI: --  PVR: --  PVRI: --, ABG - ( 12 Apr 2022 22:30 )  pH, Arterial: 7.36  pH, Blood: x     /  pCO2: 41    /  pO2: 189   / HCO3: 23    / Base Excess: -2.2  /  SaO2: 99.7                Intake and output was reviewed and the fluid balance was calculated  Daily     Daily   I&O's Summary    12 Apr 2022 07:01  -  13 Apr 2022 00:57  --------------------------------------------------------  IN: 513.4 mL / OUT: 100 mL / NET: 413.4 mL        All lines and drain sites were assessed  Glycemic trend was reviewedCAPILLARY BLOOD GLUCOSE        No acute change in mental status  Auscultation of the chest reveals equal bs  L SC intro plus TVP  Abdomen is soft  Extremities are warm and well perfused  Wounds appear clean and unremarkable  Antibiotics are periop    labs  CBC Full  -  ( 12 Apr 2022 15:31 )  WBC Count : 8.53 K/uL  RBC Count : 4.54 M/uL  Hemoglobin : 14.0 g/dL  Hematocrit : 42.0 %  Platelet Count - Automated : 197 K/uL  Mean Cell Volume : 92.5 fl  Mean Cell Hemoglobin : 30.8 pg  Mean Cell Hemoglobin Concentration : 33.3 gm/dL  Auto Neutrophil # : 6.44 K/uL  Auto Lymphocyte # : 1.42 K/uL  Auto Monocyte # : 0.45 K/uL  Auto Eosinophil # : 0.14 K/uL  Auto Basophil # : 0.04 K/uL  Auto Neutrophil % : 75.5 %  Auto Lymphocyte % : 16.6 %  Auto Monocyte % : 5.3 %  Auto Eosinophil % : 1.6 %  Auto Basophil % : 0.5 %    04-12    138  |  105  |  15  ----------------------------<  108<H>  4.4   |  22  |  0.78    Ca    8.8      12 Apr 2022 15:31    TPro  6.2  /  Alb  3.7  /  TBili  0.5  /  DBili  x   /  AST  22  /  ALT  12  /  AlkPhos  71  04-12    PT/INR - ( 12 Apr 2022 15:31 )   PT: 13.5 sec;   INR: 1.13          PTT - ( 12 Apr 2022 15:31 )  PTT:31.3 sec  The current medications were reviewed   MEDICATIONS  (STANDING):  atorvastatin 40 milliGRAM(s) Oral at bedtime  ceFAZolin   IVPB 2000 milliGRAM(s) IV Intermittent every 8 hours  chlorhexidine 2% Cloths 1 Application(s) Topical daily  dexMEDEtomidine Infusion 0.2 MICROgram(s)/kG/Hr (2.84 mL/Hr) IV Continuous <Continuous>  heparin   Injectable 5000 Unit(s) SubCutaneous every 8 hours  pantoprazole    Tablet 40 milliGRAM(s) Oral before breakfast  phenylephrine    Infusion 0.1 MICROgram(s)/kG/Min (2.13 mL/Hr) IV Continuous <Continuous>  sodium chloride 0.9%. 1000 milliLiter(s) (10 mL/Hr) IV Continuous <Continuous>    MEDICATIONS  (PRN):  acetaminophen     Tablet .. 650 milliGRAM(s) Oral every 6 hours PRN Mild Pain (1 - 3)       PROBLEM LIST/ ASSESSMENT:  HEALTH ISSUES - PROBLEM Dx:      ,   Patient is a 81y old  Female who presents with a chief complaint of severe AS(12 Apr 2022 17:26)     s/p TF-TAVR    My plan includes :      c/w phenylephrine   titrate to maintain MAP >70  c/w precedex infusion for agitation  1:1 observation to prevent self harm  c/w TVP as backup  EP f/u in am  supplemental O2 as needed  titrate Fio2 to maintain Sao2 >95  Serial ABGs      close hemodynamic, ventilatory and drain monitoring and management per post op routine    Monitor for arrhythmias and monitor parameters for organ perfusion  monitor neurologic status  Head of the bed should remain elevated to 45 deg .   chest PT and IS will be encouraged  monitor adequacy of oxygenation and ventilation and attempt to wean oxygen  Nutritional goals will be met using po eventually , ensure adequate caloric intake and montior the same  Stress ulcer and VTE prophylaxis will be achieved    Glycemic control is satisfactory  Electrolytes have been repleted as necessary and wound care has been carried out. Pain control has been achieved.   agressive physical therapy and early mobility and ambulation goals will be met   The family was updated about the course and plan  CRITICAL CARE TIME SPENT in evaluation and management, reassessments, review and interpretation of labs and x-rays, ventilator and hemodynamic management, formulating a plan and coordinating care: ___90____ MIN.  Time does not include procedural time.  CTICU ATTENDING     					    Fabricio Julian MD

## 2022-04-12 NOTE — PROGRESS NOTE ADULT - ASSESSMENT
82 y/o F with a PMHx of HTN, HLD, AF (on eliquis at home but non-compliant with taking), chronic diastolic heart failure, severe AS (s/p BAV Nov 11 2021) who presented to Dr. Hernández after cancelling procedure multiple times prior. Pt agreeable to move forward to TAVR. State she been feeling dizz 80 y/o F with a PMHx of HTN, HLD, AF (on eliquis at home but non-compliant with taking), chronic diastolic heart failure, severe AS (s/p BAV Nov 11 2021) who presented to Dr. Hernández after cancelling procedure multiple times prior. Pt agreeable to move forward to TAVR. State she been feeling dizzy with SOB. On 4/12/22 pt underwent TAVR corevalve. Intraoperatively, pt went into complete heart block during deployment then converted back to NSR with frequent PVCs. Arrived to  under ICU level care. Upon awaking from sedation, pt became extremely hostile and yelling profanities at staff and pulling at lines. No confusion. Reassured patient with no success. Started low dose Precedex for patient safety and placed mittens on hands. Labs and CXR okay. EP consulted for intraoperative CHB. TVP remains in place in left IJ.    Plan:    Neurovascular:   -post-op agitation requiring low dose precedex, wean as tolerated.   -pain control with acetaminophen PRN     Cardiovascular:   -POD0 TAVR corevalve     -intraop CHB, EP on, appreciate recs    -daily EKGs    -ECHO in AM     -current rhythm: NSR with frequent couplets of PVCs    -TVP in place on VVI backup 40   -Hemodynamically stable.   -Monitor: BP, HR, tele    Respiratory:   -Oxygenating well on room air  -Encourage continued use of IS 10x/hr and frequent ambulation  -CXR: no acute pathology, valve in proper location     GI:  -GI PPX: pantoprazole 40mg daily   -PO Diet: advance as tolerated   -Bowel Regimen: none needed at this time     Renal / :  -Continue to monitor renal function: BUN/Cr 15/0.78  -Monitor I/O's daily     Endocrine:    -No hx of DM or thyroid dx  -A1c: 5  -TSH: 2.5    Hematologic:  -CBC: H/H- 14/42  -Coagulation Panel: WNL     ID:  -Temperature: afebrile   -CBC: WBC- 8   -Continue to observe for SIRS/Sepsis Syndrome.    Prophylaxis:  -DVT prophylaxis with 5000 SubQ Heparin q8h.  -Continue with SCD's b/l while patient is at rest     Disposition:  -continue with ICU level care

## 2022-04-12 NOTE — CONSULT NOTE ADULT - ASSESSMENT
81 year old female with a history of hypertension, hyperlipidemia, Afib (on Eliquis), chronic diastolic heart failure with severe AS s/p BAV on 11/11/2021 who presents for scheduled TAVR.  Patient had TAVR with Christoph valve today, she had transient episode of HB during procedure, post procedure ECG  ms,  ms, telemetry sinus rhythm with PVCs, bigeminy, no evidences of HB .  Continue TVP, will follow.     81 year old female with a history of hypertension, hyperlipidemia, Afib (on Eliquis), chronic diastolic heart failure with severe AS s/p BAV on 11/11/2021 who presents for scheduled TAVR.  Patient had TAVR with MDT Evolut valve today, she had transient episode of HB during procedure, post procedure ECG  ms,  ms, telemetry sinus rhythm with PVCs, bigeminy, no evidences of HB .  Continue TVP, will follow.

## 2022-04-12 NOTE — H&P ADULT - NSICDXPASTSURGICALHX_GEN_ALL_CORE_FT
PAST SURGICAL HISTORY:  No significant past surgical history      PAST SURGICAL HISTORY:  H/O aortic valvuloplasty 11/2021

## 2022-04-12 NOTE — H&P ADULT - HISTORY OF PRESENT ILLNESS
81 year old female with a history of hypertension, hyperlipidemia, Afib (on Eliquis), chronic diastolic heart failure with severe AS s/p BAV on 11/11/2021 who presents for follow up.    Since her last visit, the patient had canceled her TAVR procedure multiple times. The patient agrees to proceed at this time. She states she has been feeling some dizziness and some shortness of breath. She denies chest pain, shortness of breath at rest, syncope, orthopnea, PND, or LE edema. She states that she is unable to do her daily gym routine every day that she used to be able to do. She believes her symptoms are from aging and not from her valve.     Patient seen in same day holding area; Reports no changes to PMHx or medications since last seen by our team. Denies acute or current SOB, chest pain, palpitation, N/V/D, fever/chills, recent illness, or any other concerning symptoms.  81 year old female with a history of hypertension, hyperlipidemia, Afib (on Eliquis), chronic diastolic heart failure with severe AS s/p BAV on 11/11/2021 who presents for follow up.    Since her last visit, the patient had canceled her TAVR procedure multiple times. The patient agrees to proceed at this time. She states she has been feeling some dizziness and some shortness of breath. She denies chest pain, shortness of breath at rest, syncope, orthopnea, PND, or LE edema. She states that she is unable to do her daily gym routine every day that she used to be able to do. She believes her symptoms are from aging and not from her valve.     Last dose eliquis 4/9.  Not taking aspirin. Patient seen in same day holding area; Reports no changes to PMHx or medications since last seen by our team. Denies acute or current SOB, chest pain, palpitation, N/V/D, fever/chills, recent illness, or any other concerning symptoms.

## 2022-04-12 NOTE — PROCEDURE NOTE - NSPOSTPRCRAD_GEN_A_CORE
chest radiograph/no pneumothorax/pacing catheter located in the right ventricle
no pneumothorax/post-procedure radiography performed

## 2022-04-12 NOTE — H&P ADULT - NSHPREVIEWOFSYSTEMS_GEN_ALL_CORE
Review of Systems  CONSTITUTIONAL:  Denies Fevers / chills, sweats, fatigue, weight loss, weight gain                                      NEURO:  Denies parathesias, seizures, syncope, confusion                                                                                EYES:  Denies Blurry vision, discharge, pain, loss of vision                                                                                    ENMT:  Denies Difficulty hearing, vertigo, dysphagia, epistaxis, recent dental work                                       CV:  Denies Chest pain, palpitations, BRITTON, orthopnea                                                                                          RESPIRATORY:  Denies Wheezing, SOB, cough / sputum, hemoptysis                                                                GI:  Denies Nausea, vommiting, diarrhea, constipation, melena, difficulty swallowing                                               : Denies Hematuria, dysuria, urgency, incontinence                                                                                         MUSKULOSKELETAL:  Denies arthritis, joint swelling, muscle weakness                                                             SKIN/BREAST:  Denies rash, itching, johnathan loss, masses                                                                                            PSYCH:  Denies depresion, anxiety, suicidal ideation                                                                                               HEME/LYMPH:  Denies bruises easily, enlarged lymph nodes, tender lymph nodes                                        ENDOCRINE:  Denies cold intolerance, heat intolerance, polydipsia

## 2022-04-12 NOTE — H&P ADULT - NSHPPHYSICALEXAM_GEN_ALL_CORE
GEN: NAD  Neuro: A&Ox3.  No focal deficits.  Moving all extremities.   HEENT: No obvious abnormalities  CV: S1S2, regular, no murmurs appreciated.  No carotid bruits.  No JVD  Lungs: Clear B/L.  No wheezing, rales or rhonchi  ABD: Soft, non-tender, non-distended.  +Bowel sounds  EXT: Warm and well perfused.  No peripheral edema noted  PV: Pedal pulses palpable

## 2022-04-13 LAB
ANION GAP SERPL CALC-SCNC: 10 MMOL/L — SIGNIFICANT CHANGE UP (ref 5–17)
APTT BLD: 29.9 SEC — SIGNIFICANT CHANGE UP (ref 27.5–35.5)
BUN SERPL-MCNC: 14 MG/DL — SIGNIFICANT CHANGE UP (ref 7–23)
CALCIUM SERPL-MCNC: 9 MG/DL — SIGNIFICANT CHANGE UP (ref 8.4–10.5)
CHLORIDE SERPL-SCNC: 107 MMOL/L — SIGNIFICANT CHANGE UP (ref 96–108)
CO2 SERPL-SCNC: 23 MMOL/L — SIGNIFICANT CHANGE UP (ref 22–31)
CREAT SERPL-MCNC: 0.65 MG/DL — SIGNIFICANT CHANGE UP (ref 0.5–1.3)
EGFR: 88 ML/MIN/1.73M2 — SIGNIFICANT CHANGE UP
GAS PNL BLDA: SIGNIFICANT CHANGE UP
GLUCOSE SERPL-MCNC: 129 MG/DL — HIGH (ref 70–99)
HCT VFR BLD CALC: 42.1 % — SIGNIFICANT CHANGE UP (ref 34.5–45)
HGB BLD-MCNC: 13.9 G/DL — SIGNIFICANT CHANGE UP (ref 11.5–15.5)
INR BLD: 1.16 — SIGNIFICANT CHANGE UP (ref 0.88–1.16)
MAGNESIUM SERPL-MCNC: 2 MG/DL — SIGNIFICANT CHANGE UP (ref 1.6–2.6)
MCHC RBC-ENTMCNC: 31.1 PG — SIGNIFICANT CHANGE UP (ref 27–34)
MCHC RBC-ENTMCNC: 33 GM/DL — SIGNIFICANT CHANGE UP (ref 32–36)
MCV RBC AUTO: 94.2 FL — SIGNIFICANT CHANGE UP (ref 80–100)
NRBC # BLD: 0 /100 WBCS — SIGNIFICANT CHANGE UP (ref 0–0)
PHOSPHATE SERPL-MCNC: 3.9 MG/DL — SIGNIFICANT CHANGE UP (ref 2.5–4.5)
PLATELET # BLD AUTO: 178 K/UL — SIGNIFICANT CHANGE UP (ref 150–400)
POTASSIUM SERPL-MCNC: 3.9 MMOL/L — SIGNIFICANT CHANGE UP (ref 3.5–5.3)
POTASSIUM SERPL-SCNC: 3.9 MMOL/L — SIGNIFICANT CHANGE UP (ref 3.5–5.3)
PROTHROM AB SERPL-ACNC: 13.8 SEC — HIGH (ref 10.5–13.4)
RBC # BLD: 4.47 M/UL — SIGNIFICANT CHANGE UP (ref 3.8–5.2)
RBC # FLD: 13.9 % — SIGNIFICANT CHANGE UP (ref 10.3–14.5)
SARS-COV-2 RNA SPEC QL NAA+PROBE: SIGNIFICANT CHANGE UP
SODIUM SERPL-SCNC: 140 MMOL/L — SIGNIFICANT CHANGE UP (ref 135–145)
WBC # BLD: 12.2 K/UL — HIGH (ref 3.8–10.5)
WBC # FLD AUTO: 12.2 K/UL — HIGH (ref 3.8–10.5)

## 2022-04-13 PROCEDURE — 71045 X-RAY EXAM CHEST 1 VIEW: CPT | Mod: 26

## 2022-04-13 PROCEDURE — 99231 SBSQ HOSP IP/OBS SF/LOW 25: CPT

## 2022-04-13 PROCEDURE — 93306 TTE W/DOPPLER COMPLETE: CPT | Mod: 26

## 2022-04-13 RX ORDER — POTASSIUM CHLORIDE 20 MEQ
20 PACKET (EA) ORAL ONCE
Refills: 0 | Status: COMPLETED | OUTPATIENT
Start: 2022-04-13 | End: 2022-04-13

## 2022-04-13 RX ORDER — POTASSIUM CHLORIDE 20 MEQ
10 PACKET (EA) ORAL ONCE
Refills: 0 | Status: DISCONTINUED | OUTPATIENT
Start: 2022-04-13 | End: 2022-04-13

## 2022-04-13 RX ADMIN — Medication 100 MILLIGRAM(S): at 14:08

## 2022-04-13 RX ADMIN — HEPARIN SODIUM 5000 UNIT(S): 5000 INJECTION INTRAVENOUS; SUBCUTANEOUS at 05:22

## 2022-04-13 RX ADMIN — ATORVASTATIN CALCIUM 40 MILLIGRAM(S): 80 TABLET, FILM COATED ORAL at 22:01

## 2022-04-13 RX ADMIN — DEXMEDETOMIDINE HYDROCHLORIDE IN 0.9% SODIUM CHLORIDE 2.84 MICROGRAM(S)/KG/HR: 4 INJECTION INTRAVENOUS at 08:54

## 2022-04-13 RX ADMIN — PANTOPRAZOLE SODIUM 40 MILLIGRAM(S): 20 TABLET, DELAYED RELEASE ORAL at 06:49

## 2022-04-13 RX ADMIN — HEPARIN SODIUM 5000 UNIT(S): 5000 INJECTION INTRAVENOUS; SUBCUTANEOUS at 14:08

## 2022-04-13 RX ADMIN — DEXMEDETOMIDINE HYDROCHLORIDE IN 0.9% SODIUM CHLORIDE 2.84 MICROGRAM(S)/KG/HR: 4 INJECTION INTRAVENOUS at 16:16

## 2022-04-13 RX ADMIN — Medication 100 MILLIEQUIVALENT(S): at 05:22

## 2022-04-13 RX ADMIN — HEPARIN SODIUM 5000 UNIT(S): 5000 INJECTION INTRAVENOUS; SUBCUTANEOUS at 22:00

## 2022-04-13 RX ADMIN — Medication 100 MILLIGRAM(S): at 05:22

## 2022-04-13 RX ADMIN — Medication 100 MILLIGRAM(S): at 22:00

## 2022-04-13 RX ADMIN — CHLORHEXIDINE GLUCONATE 1 APPLICATION(S): 213 SOLUTION TOPICAL at 23:36

## 2022-04-13 NOTE — PROGRESS NOTE ADULT - SUBJECTIVE AND OBJECTIVE BOX
Patient discussed on morning rounds with Dr. Hernández      Operation / Date: 4/12/22 TAVR Corevalve    SUBJECTIVE ASSESSMENT:  81y Female seen and examined at bedside.  Patient intermittently agitated, on precedex and one to one.          Vital Signs Last 24 Hrs  T(C): 36.4 (13 Apr 2022 09:32), Max: 36.4 (13 Apr 2022 09:32)  T(F): 97.6 (13 Apr 2022 09:32), Max: 97.6 (13 Apr 2022 09:32)  HR: 59 (13 Apr 2022 10:00) (59 - 84)  BP: 136/63 (12 Apr 2022 23:00) (90/42 - 139/70)  BP(mean): 91 (12 Apr 2022 23:00) (60 - 92)  RR: 12 (13 Apr 2022 10:00) (12 - 23)  SpO2: 100% (13 Apr 2022 10:00) (97% - 100%)  I&O's Detail    12 Apr 2022 07:01  -  13 Apr 2022 07:00  --------------------------------------------------------  IN:    Albumin 5%  - 250 mL: 250 mL    Dexmedetomidine: 156.6 mL    Phenylephrine: 80.8 mL    sodium chloride 0.9%: 170 mL  Total IN: 657.4 mL    OUT:    Voided (mL): 500 mL  Total OUT: 500 mL    Total NET: 157.4 mL      13 Apr 2022 07:01  -  13 Apr 2022 11:06  --------------------------------------------------------  IN:    Dexmedetomidine: 47.6 mL    Phenylephrine: 16.8 mL    sodium chloride 0.9%: 40 mL  Total IN: 104.4 mL    OUT:  Total OUT: 0 mL    Total NET: 104.4 mL    PHYSICAL EXAM:    General: resting in bed in NAD   Neurological: AOx3. Motor skills grossly intact  Cardiovascular: Normal S1/S2. Regular rate/rhythm. +murmur  Respiratory: Lungs CTA bilaterally. No wheezing or rales  Gastrointestinal: +BS in all 4 quadrants. Non-distended. Soft. Non-tender  Extremities: Strength 5/5 b/l upper/lower extremities. Sensation grossly intact upper/lower extremities. No edema. No calf tenderness.  Vascular: Radial 2+bilaterally, DP 2+ b/l  Incision Sites: b/l groin incisions with dressing in place c/d/i.,     LABS:                        13.9   12.20 )-----------( 178      ( 13 Apr 2022 03:48 )             42.1       COUMADIN:  No. REASON: .    PT/INR - ( 13 Apr 2022 03:48 )   PT: 13.8 sec;   INR: 1.16          PTT - ( 13 Apr 2022 03:48 )  PTT:29.9 sec    04-13    140  |  107  |  14  ----------------------------<  129<H>  3.9   |  23  |  0.65    Ca    9.0      13 Apr 2022 03:48  Phos  3.9     04-13  Mg     2.0     04-13    TPro  6.2  /  Alb  3.7  /  TBili  0.5  /  DBili  x   /  AST  22  /  ALT  12  /  AlkPhos  71  04-12          MEDICATIONS  (STANDING):  atorvastatin 40 milliGRAM(s) Oral at bedtime  ceFAZolin   IVPB 2000 milliGRAM(s) IV Intermittent every 8 hours  chlorhexidine 2% Cloths 1 Application(s) Topical daily  dexMEDEtomidine Infusion 0.2 MICROgram(s)/kG/Hr (2.84 mL/Hr) IV Continuous <Continuous>  heparin   Injectable 5000 Unit(s) SubCutaneous every 8 hours  pantoprazole    Tablet 40 milliGRAM(s) Oral before breakfast  phenylephrine    Infusion 0.1 MICROgram(s)/kG/Min (2.13 mL/Hr) IV Continuous <Continuous>  sodium chloride 0.9%. 1000 milliLiter(s) (10 mL/Hr) IV Continuous <Continuous>    MEDICATIONS  (PRN):  acetaminophen     Tablet .. 650 milliGRAM(s) Oral every 6 hours PRN Mild Pain (1 - 3)        RADIOLOGY & ADDITIONAL TESTS:

## 2022-04-13 NOTE — PROGRESS NOTE ADULT - ASSESSMENT
80 y/o F with a PMHx of HTN, HLD, AF (on eliquis at home but non-compliant with taking), chronic diastolic heart failure, severe AS (s/p BAV Nov 11 2021) who presented to Dr. Hernández after cancelling procedure multiple times prior. Pt agreeable to move forward to TAVR. State she been feeling dizzy with SOB. On 4/12/22 pt underwent TAVR corevalve. Intraoperatively, pt went into complete heart block during deployment then converted back to NSR with frequent PVCs. Arrived to  under ICU level care. Upon awaking from sedation, pt became extremely hostile and yelling profanities at staff and pulling at lines. No confusion. Reassured patient with no success. Started low dose Precedex for patient safety and placed mittens on hands. Labs and CXR okay. EP consulted for intraoperative CHB. POD 0 patient became severely agitated, self DC'd TVP, required emergent TVP placement.  POD 1 pending EP evaluation for PPM.    Plan:    Neurovascular:   -post-op agitation requiring low dose precedex, wean as tolerated.   -pain control with acetaminophen PRN     Cardiovascular:   -POD1 TAVR corevalve     -intraop CHB, EP on, appreciate recs    -daily EKGs    -ECHO in AM     -current rhythm: NSR with frequent couplets of PVCs    -TVP in place on VVI backup 40   -Hemodynamically stable.   -Monitor: BP, HR, tele    Respiratory:   -Oxygenating well on room air  -Encourage continued use of IS 10x/hr and frequent ambulation  -CXR: no acute pathology, valve in proper location     GI:  -GI PPX: pantoprazole 40mg daily   -PO Diet: advance as tolerated   -Bowel Regimen: none needed at this time     Renal / :  -Continue to monitor renal function: BUN/Cr 14/0.65  -Monitor I/O's daily     Endocrine:    -No hx of DM or thyroid dx  -A1c: 5  -TSH: 2.5    Hematologic:  -CBC: H/H- 13.9/42.1  -Coagulation Panel: WNL     ID:  -Temperature: afebrile   -CBC: WBC- 12.20  -Continue to observe for SIRS/Sepsis Syndrome.    Prophylaxis:  -DVT prophylaxis with 5000 SubQ Heparin q8h.  -Continue with SCD's b/l while patient is at rest     Disposition:  -continue with ICU level care

## 2022-04-13 NOTE — PROGRESS NOTE ADULT - SUBJECTIVE AND OBJECTIVE BOX
EPS Progress Note    S: in bed, no complains, states that she doesn't want any procedures      MEDICATIONS  (STANDING):  atorvastatin 40 milliGRAM(s) Oral at bedtime  ceFAZolin   IVPB 2000 milliGRAM(s) IV Intermittent every 8 hours  chlorhexidine 2% Cloths 1 Application(s) Topical daily  dexMEDEtomidine Infusion 0.2 MICROgram(s)/kG/Hr (2.84 mL/Hr) IV Continuous <Continuous>  heparin   Injectable 5000 Unit(s) SubCutaneous every 8 hours  pantoprazole    Tablet 40 milliGRAM(s) Oral before breakfast  phenylephrine    Infusion 0.1 MICROgram(s)/kG/Min (2.13 mL/Hr) IV Continuous <Continuous>  sodium chloride 0.9%. 1000 milliLiter(s) (10 mL/Hr) IV Continuous <Continuous>      Telemetry: sinus rhythm with bigeminy            General:  NAD        HEENT:   PERRL, EOMI	  Neck: Supple, - JVD   Cardiovascular:  S1 S2, No JVD  Respiratory: CTA 	  Gastrointestinal:  Soft, Non-tender, + BS	  Skin: No rashes, No ecchymoses, No cyanosis  Extremities: No edema  Psychiatry: A & O x 3         Labs:                                                               13.9   12.20 )-----------( 178      ( 13 Apr 2022 03:48 )             42.1     04-13    140  |  107  |  14  ----------------------------<  129<H>  3.9   |  23  |  0.65    Ca    9.0      13 Apr 2022 03:48  Phos  3.9     04-13  Mg     2.0     04-13    TPro  6.2  /  Alb  3.7  /  TBili  0.5  /  DBili  x   /  AST  22  /  ALT  12  /  AlkPhos  71  04-12    PT/INR - ( 13 Apr 2022 03:48 )   PT: 13.8 sec;   INR: 1.16          PTT - ( 13 Apr 2022 03:48 )  PTT:29.9 sec    Assessment/Plan:  81 year old female with a history of hypertension, hyperlipidemia, Afib (on Eliquis), chronic diastolic heart failure with severe AS s/p BAV on 11/11/2021 who presents for scheduled TAVR.  Patient had TAVR with MDT [place] valve 4/12/22 , she had transient episode of HB during procedure, post procedure ECG back to base line , telemetry sinus rhythm with PVCs, bigeminy, no evidences of HB, no indications for pacing at this time.

## 2022-04-14 ENCOUNTER — TRANSCRIPTION ENCOUNTER (OUTPATIENT)
Age: 82
End: 2022-04-14

## 2022-04-14 VITALS — OXYGEN SATURATION: 98 % | RESPIRATION RATE: 12 BRPM | HEART RATE: 75 BPM

## 2022-04-14 LAB
ANION GAP SERPL CALC-SCNC: 8 MMOL/L — SIGNIFICANT CHANGE UP (ref 5–17)
APTT BLD: 29.3 SEC — SIGNIFICANT CHANGE UP (ref 27.5–35.5)
BUN SERPL-MCNC: 15 MG/DL — SIGNIFICANT CHANGE UP (ref 7–23)
CALCIUM SERPL-MCNC: 8.6 MG/DL — SIGNIFICANT CHANGE UP (ref 8.4–10.5)
CHLORIDE SERPL-SCNC: 104 MMOL/L — SIGNIFICANT CHANGE UP (ref 96–108)
CO2 SERPL-SCNC: 22 MMOL/L — SIGNIFICANT CHANGE UP (ref 22–31)
CREAT SERPL-MCNC: 0.74 MG/DL — SIGNIFICANT CHANGE UP (ref 0.5–1.3)
EGFR: 81 ML/MIN/1.73M2 — SIGNIFICANT CHANGE UP
GAS PNL BLDA: SIGNIFICANT CHANGE UP
GLUCOSE SERPL-MCNC: 130 MG/DL — HIGH (ref 70–99)
HCT VFR BLD CALC: 37.1 % — SIGNIFICANT CHANGE UP (ref 34.5–45)
HGB BLD-MCNC: 12.1 G/DL — SIGNIFICANT CHANGE UP (ref 11.5–15.5)
INR BLD: 1.14 — SIGNIFICANT CHANGE UP (ref 0.88–1.16)
MAGNESIUM SERPL-MCNC: 1.9 MG/DL — SIGNIFICANT CHANGE UP (ref 1.6–2.6)
MCHC RBC-ENTMCNC: 31.7 PG — SIGNIFICANT CHANGE UP (ref 27–34)
MCHC RBC-ENTMCNC: 32.6 GM/DL — SIGNIFICANT CHANGE UP (ref 32–36)
MCV RBC AUTO: 97.1 FL — SIGNIFICANT CHANGE UP (ref 80–100)
NRBC # BLD: 0 /100 WBCS — SIGNIFICANT CHANGE UP (ref 0–0)
PHOSPHATE SERPL-MCNC: 2.9 MG/DL — SIGNIFICANT CHANGE UP (ref 2.5–4.5)
PLATELET # BLD AUTO: 129 K/UL — LOW (ref 150–400)
POTASSIUM SERPL-MCNC: 4.4 MMOL/L — SIGNIFICANT CHANGE UP (ref 3.5–5.3)
POTASSIUM SERPL-SCNC: 4.4 MMOL/L — SIGNIFICANT CHANGE UP (ref 3.5–5.3)
PROTHROM AB SERPL-ACNC: 13.6 SEC — HIGH (ref 10.5–13.4)
RBC # BLD: 3.82 M/UL — SIGNIFICANT CHANGE UP (ref 3.8–5.2)
RBC # FLD: 14.2 % — SIGNIFICANT CHANGE UP (ref 10.3–14.5)
SODIUM SERPL-SCNC: 134 MMOL/L — LOW (ref 135–145)
WBC # BLD: 9.05 K/UL — SIGNIFICANT CHANGE UP (ref 3.8–10.5)
WBC # FLD AUTO: 9.05 K/UL — SIGNIFICANT CHANGE UP (ref 3.8–10.5)

## 2022-04-14 PROCEDURE — C1887: CPT

## 2022-04-14 PROCEDURE — 80053 COMPREHEN METABOLIC PANEL: CPT

## 2022-04-14 PROCEDURE — 85384 FIBRINOGEN ACTIVITY: CPT

## 2022-04-14 PROCEDURE — C1725: CPT

## 2022-04-14 PROCEDURE — 82803 BLOOD GASES ANY COMBINATION: CPT

## 2022-04-14 PROCEDURE — 86923 COMPATIBILITY TEST ELECTRIC: CPT

## 2022-04-14 PROCEDURE — 85610 PROTHROMBIN TIME: CPT

## 2022-04-14 PROCEDURE — 84132 ASSAY OF SERUM POTASSIUM: CPT

## 2022-04-14 PROCEDURE — 83735 ASSAY OF MAGNESIUM: CPT

## 2022-04-14 PROCEDURE — 86900 BLOOD TYPING SEROLOGIC ABO: CPT

## 2022-04-14 PROCEDURE — 86901 BLOOD TYPING SEROLOGIC RH(D): CPT

## 2022-04-14 PROCEDURE — C1889: CPT

## 2022-04-14 PROCEDURE — 84100 ASSAY OF PHOSPHORUS: CPT

## 2022-04-14 PROCEDURE — L8699: CPT

## 2022-04-14 PROCEDURE — C1769: CPT

## 2022-04-14 PROCEDURE — 87635 SARS-COV-2 COVID-19 AMP PRB: CPT

## 2022-04-14 PROCEDURE — 93306 TTE W/DOPPLER COMPLETE: CPT

## 2022-04-14 PROCEDURE — 85025 COMPLETE CBC W/AUTO DIFF WBC: CPT

## 2022-04-14 PROCEDURE — 99238 HOSP IP/OBS DSCHRG MGMT 30/<: CPT

## 2022-04-14 PROCEDURE — 82330 ASSAY OF CALCIUM: CPT

## 2022-04-14 PROCEDURE — 85730 THROMBOPLASTIN TIME PARTIAL: CPT

## 2022-04-14 PROCEDURE — C1884: CPT

## 2022-04-14 PROCEDURE — 71045 X-RAY EXAM CHEST 1 VIEW: CPT

## 2022-04-14 PROCEDURE — C1894: CPT

## 2022-04-14 PROCEDURE — 85027 COMPLETE CBC AUTOMATED: CPT

## 2022-04-14 PROCEDURE — C1760: CPT

## 2022-04-14 PROCEDURE — 86850 RBC ANTIBODY SCREEN: CPT

## 2022-04-14 PROCEDURE — 71045 X-RAY EXAM CHEST 1 VIEW: CPT | Mod: 26

## 2022-04-14 PROCEDURE — P9045: CPT

## 2022-04-14 PROCEDURE — 93355 ECHO TRANSESOPHAGEAL (TEE): CPT | Mod: 26

## 2022-04-14 PROCEDURE — 80048 BASIC METABOLIC PNL TOTAL CA: CPT

## 2022-04-14 PROCEDURE — 84295 ASSAY OF SERUM SODIUM: CPT

## 2022-04-14 PROCEDURE — 36415 COLL VENOUS BLD VENIPUNCTURE: CPT

## 2022-04-14 RX ORDER — ACETAMINOPHEN 500 MG
2 TABLET ORAL
Qty: 32 | Refills: 0
Start: 2022-04-14 | End: 2022-04-17

## 2022-04-14 RX ORDER — POLYETHYLENE GLYCOL 3350 17 G/17G
17 POWDER, FOR SOLUTION ORAL
Qty: 119 | Refills: 0
Start: 2022-04-14 | End: 2022-04-20

## 2022-04-14 RX ORDER — ATORVASTATIN CALCIUM 80 MG/1
1 TABLET, FILM COATED ORAL
Qty: 30 | Refills: 0
Start: 2022-04-14 | End: 2022-05-13

## 2022-04-14 RX ORDER — APIXABAN 2.5 MG/1
1 TABLET, FILM COATED ORAL
Qty: 0 | Refills: 0 | DISCHARGE

## 2022-04-14 RX ORDER — PANTOPRAZOLE SODIUM 20 MG/1
1 TABLET, DELAYED RELEASE ORAL
Qty: 30 | Refills: 0
Start: 2022-04-14 | End: 2022-05-13

## 2022-04-14 RX ORDER — METOPROLOL TARTRATE 50 MG
1 TABLET ORAL
Qty: 0 | Refills: 0 | DISCHARGE

## 2022-04-14 RX ORDER — MAGNESIUM SULFATE 500 MG/ML
2 VIAL (ML) INJECTION ONCE
Refills: 0 | Status: COMPLETED | OUTPATIENT
Start: 2022-04-14 | End: 2022-04-14

## 2022-04-14 RX ORDER — FUROSEMIDE 40 MG
1 TABLET ORAL
Qty: 30 | Refills: 0
Start: 2022-04-14 | End: 2022-05-13

## 2022-04-14 RX ORDER — APIXABAN 2.5 MG/1
1 TABLET, FILM COATED ORAL
Qty: 60 | Refills: 0
Start: 2022-04-14 | End: 2022-05-13

## 2022-04-14 RX ADMIN — Medication 100 MILLIGRAM(S): at 06:43

## 2022-04-14 RX ADMIN — HEPARIN SODIUM 5000 UNIT(S): 5000 INJECTION INTRAVENOUS; SUBCUTANEOUS at 06:42

## 2022-04-14 RX ADMIN — Medication 25 GRAM(S): at 06:43

## 2022-04-14 RX ADMIN — PANTOPRAZOLE SODIUM 40 MILLIGRAM(S): 20 TABLET, DELAYED RELEASE ORAL at 06:43
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
## 2022-04-14 NOTE — DISCHARGE NOTE NURSING/CASE MANAGEMENT/SOCIAL WORK - NSDCFUADDAPPT_GEN_ALL_CORE_FT
-The office will call you with your follow up appointments. If you do not hear from them by 4/17, call them at [phone].

## 2022-04-14 NOTE — DISCHARGE NOTE PROVIDER - HOSPITAL COURSE
82 y/o F with a PMHx of HTN, HLD, AF (on eliquis at home but non-compliant with taking), chronic diastolic heart failure, severe AS (s/p BAV Nov 11 2021) who presented to Dr. Hernández after cancelling procedure multiple times prior. Pt agreeable to move forward to TAVR. States she been feeling dizzy with SOB. On 4/12/22 pt underwent TAVR corevalve. Intraoperatively, pt went into complete heart block during deployment then converted back to NSR with frequent PVCs. Arrived to the stepdown unit under ICU level care. Upon awaking from sedation, patient was extremely hostile and yelling profanities at staff and pulling at lines. No confusion. Reassured patient with no success. Started low dose Precedex for patient safety and placed mittens on hands. EP consulted for intraoperative CHB. POD 0 patient became severely agitated, self DC'd TVP, required emergent TVP placement.  POD 1 EP consulted, states there is no need for PPM at this time. Echo completed showing mild to moderate MR, no pericardial effusion, and 29 mm CoreValveis in the aortic position with apparent normal function. There is trace paravalvular aortic regurgitation. Patient remained hostile and agitated, kicking a nurse overnight. POD2 decreased agitation, TVP removed and precedex stopped. Per EP.... Patient and family educated on the benefits and importance of wearing the MCOT. Tolerating PO diet, satting well on room air, ambulating with assistance. Per Dr. Hernández she is medically stable for discharge home with her daughter at this time, 4/14/22.    incomplete*    Over 35 minutes was spent with the patient reviewing the discharge material including medications, follow up appointments, recovery, concerning symptoms, and how to contact their health care providers if they have questions. 82 y/o F with a PMHx of HTN, HLD, AF (on eliquis at home but non-compliant with taking), chronic diastolic heart failure, severe AS (s/p BAV Nov 11 2021) who presented to Dr. Hernández after cancelling procedure multiple times prior. Pt agreeable to move forward to TAVR. States she been feeling dizzy with SOB. On 4/12/22 pt underwent TAVR corevalve. Intraoperatively, pt went into complete heart block during deployment then converted back to NSR with frequent PVCs. Arrived to the stepdown unit under ICU level care. Upon awaking from sedation, patient was extremely hostile and yelling profanities at staff and pulling at lines. No confusion. Reassured patient with no success. Started low dose Precedex for patient safety and placed mittens on hands. EP consulted for intraoperative CHB. POD 0 patient became severely agitated, self DC'd TVP, required emergent TVP placement.  POD 1 EP consulted, states there is no need for PPM at this time. Echo completed showing mild to moderate MR, no pericardial effusion, and 29 mm CoreValve is in the aortic position with apparent normal function. There is trace paravalvular aortic regurgitation. Patient remained hostile and agitated, kicking a nurse overnight. POD2 decreased agitation, TVP removed and precedex stopped. Per EP, NTD can follow up with their clinic as an outpatient. BB held for CHB and low blood pressure. Patient and family educated on the benefits and importance of wearing the MCOT and restarting eliquis at home tonight. Tolerating PO diet, satting well on room air, ambulating with assistance. Per Dr. Hernández she is medically stable for discharge home with her daughter at this time, 4/14/22.    Over 35 minutes was spent with the patient reviewing the discharge material including medications, follow up appointments, recovery, concerning symptoms, and how to contact their health care providers if they have questions.

## 2022-04-14 NOTE — DISCHARGE NOTE PROVIDER - PROVIDER TOKENS
PROVIDER:[TOKEN:[[id number]],FOLLOWUP:[1 week]],PROVIDER:[TOKEN:[[id number]],FOLLOWUP:[2 weeks]] PROVIDER:[TOKEN:[[id number]],SCHEDULEDAPPT:[04/25/2022],SCHEDULEDAPPTTIME:[12:00 PM]],PROVIDER:[TOKEN:[[id number]],FOLLOWUP:[2 weeks]]

## 2022-04-14 NOTE — DISCHARGE NOTE PROVIDER - NSDCFUADDAPPT_GEN_ALL_CORE_FT
4 = completely dependent -The office will call you with your follow up appointments. If you do not hear from them by 4/17, call them at [phone]. -Please call and make an appointment with your referring doctor, Dr. Iman Parsons.    -Please call our electrophysiology clinic for a follow up appointment at [phone].     -If you have trouble making these appointments, call our office at [phone].

## 2022-04-14 NOTE — PROGRESS NOTE ADULT - ASSESSMENT
Nahid Hernández)  Cardiology; Interventional Cardiology  [address] [place] [address]  Phone: [phone]  Fax: [phone]  Scheduled Appointment: 04/25/2022 12:00 PM    Iman Parsons)  Cardiovascular Medicine  [facility], [facility], [address] [place] [address], [address]  [place] [address]  Phone: [phone]  Fax: [phone]  Follow Up Time: 2 weeks    PRISCA VAZQUEZ ; 04/25/2022 ; [facility] Surg [address]  -Please call and make an appointment with your referring doctor, Dr. Iman Parsons.    -Please call our electrophysiology clinic for a follow up appointment at [phone].     -If you have trouble making these appointments, call our office at [phone].

## 2022-04-14 NOTE — DISCHARGE NOTE PROVIDER - CARE PROVIDER_API CALL
Nahid Hernández)  Cardiology; Interventional Cardiology  [address] [place] [address]  Phone: [phone]  Fax: [phone]  Follow Up Time: 1 week    Iman Parsons)  Cardiovascular Medicine  [facility], [facility], [address] [place] [address], [address]  [place] [address]  Phone: [phone]  Fax: [phone]  Follow Up Time: 2 weeks   Nahid Hernández)  Cardiology; Interventional Cardiology  [address] [place] [address]  Phone: [phone]  Fax: [phone]  Scheduled Appointment: 04/25/2022 12:00 PM    Iman Parsons)  Cardiovascular Medicine  [facility], [facility], [address] [place] [address], [address]  [place] [address]  Phone: [phone]  Fax: [phone]  Follow Up Time: 2 weeks

## 2022-04-14 NOTE — DISCHARGE NOTE NURSING/CASE MANAGEMENT/SOCIAL WORK - PATIENT PORTAL LINK FT
You can access the FollowMyHealth Patient Portal offered by [facility] by registering at the following website: [email or web address]. By joining [facility]’s FollowMyHealth portal, you will also be able to view your health information using other applications (apps) compatible with our system.

## 2022-04-14 NOTE — DISCHARGE NOTE PROVIDER - NSDCMRMEDTOKEN_GEN_ALL_CORE_FT
acetaminophen 325 mg oral tablet: 2 tab(s) orally every 6 hours, As Needed for mild pain   apixaban 2.5 mg oral tablet: 1 tab(s) orally every 12 hours  aspirin 81 mg oral delayed release tablet: 1 tab(s) orally once a day  atorvastatin 40 mg oral tablet: 1 tab(s) orally once a day (at bedtime)  Eliquis 2.5 mg oral tablet: 1 tab(s) orally 2 times a day  furosemide 40 mg oral tablet: 1 tab(s) orally once a day  metoprolol succinate 25 mg oral tablet, extended release: 1 tab(s) orally once a day  pantoprazole 40 mg oral delayed release tablet: 1 tab(s) orally once a day (before a meal)  polyethylene glycol 3350 oral powder for reconstitution: 17 gram(s) orally once a day, As Needed -for constipation   Rolling walker: ICD code: I35.2  HT: 160cm  WT: 56.70cm  senna oral tablet: 2 tab(s) orally once a day (at bedtime)   acetaminophen 325 mg oral tablet: 2 tab(s) orally every 6 hours, As Needed for mild pain   apixaban 2.5 mg oral tablet: 1 tab(s) orally every 12 hours  aspirin 81 mg oral delayed release tablet: 1 tab(s) orally once a day  atorvastatin 40 mg oral tablet: 1 tab(s) orally once a day (at bedtime)  Eliquis 2.5 mg oral tablet: 1 tab(s) orally 2 times a day  pantoprazole 40 mg oral delayed release tablet: 1 tab(s) orally once a day (before a meal)  Rolling walker: ICD code: I35.2  HT: 160cm  WT: 56.70cm   acetaminophen 325 mg oral tablet: 2 tab(s) orally every 6 hours, As needed, Mild Pain (1 - 3)  apixaban 2.5 mg oral tablet: 1 tab(s) orally every 12 hours  atorvastatin 40 mg oral tablet: 1 tab(s) orally once a day (at bedtime)  furosemide 20 mg oral tablet: 1 tab(s) orally once a day   pantoprazole 40 mg oral delayed release tablet: 1 tab(s) orally once a day (before a meal)  polyethylene glycol 3350 oral powder for reconstitution: 17 gram(s) orally once a day, As Needed -for constipation  -for constipation   Rolling walker: ICD code: I35.2  HT: 160cm  WT: 56.70cm

## 2022-04-14 NOTE — DISCHARGE NOTE PROVIDER - CARE PROVIDERS DIRECT ADDRESSES
[email or web address]

## 2022-04-14 NOTE — DISCHARGE NOTE PROVIDER - NSDCFUADDINST_GEN_ALL_CORE_FT
You had a MCOT monitor (an external cardiac rhythm monitoring device) placed on your day of discharge.  This helps us monitor your heart while you are out of the hospital for 30 days after discharge. Should your heart go into an abnormal or dangerous rhythm you will receive a call from the MCOT team and your Structural Heart team of Doctors and PA's will be notified.    1. Keep the monitor within 30 feet of you at all times.  2. When you feel any symptom (chest pain, dizziness, palpitations, weakness, fatigue or anything outside of your normal), press the “Record Symptoms” button on the main phone of your phone  3. Shower or exercise as normal while wearing the MCOT Patch. Do not swim or take a bath. Patch is water-resistant, not waterproof  4. When the battery is low on the phone or on the device, use the supplied . The monitor will show a warning message when the battery is low.  5. Do not remove the patch from your skin after you begin monitoring. With normal wear, each patch should last 5 days. To replace the patch follow instructions in the MCOT box with the Patch Guide  6. Any issues with the MCOT device or phone please call Customer Service at [phone].  7. If you have any other questions at all please call the Structural Heart office at [phone]    -Walk daily as tolerated and use your incentive spirometer 10 times every hour while you are awake.     -Please weigh yourself daily. If you notice over a 3 pound weight gain in 3 days, this is a sign you are likely retaining too much fluid. It is imperative you call our right away with unexplained rapid weight gain.      -Please continue to wear the compression stockings given to you in the hospital at home. This is a way to prevent fluid from building up in your legs.     -No driving or strenuous activity/exercise until cleared by your surgeon.    -Gently clean your incisions with unscented/antibacterial soap and water, pat dry.  You may leave them open to air.    -Call your doctor if you have shortness of breath, chest pain not relieved by pain medication, dizziness, fever >101.5, or increased redness or drainage from incisions.

## 2022-04-14 NOTE — DISCHARGE NOTE NURSING/CASE MANAGEMENT/SOCIAL WORK - NSDCPEFALRISK_GEN_ALL_CORE
For information on Fall & Injury Prevention, visit: [email or web address] OR  [email or web address] OR  https://www.cdc.gov/steadi/patient.html

## 2022-04-15 ENCOUNTER — NON-APPOINTMENT (OUTPATIENT)
Age: 82
End: 2022-04-15

## 2022-04-15 RX ORDER — METOPROLOL TARTRATE 25 MG/1
25 TABLET, FILM COATED ORAL
Refills: 0 | Status: DISCONTINUED | COMMUNITY
End: 2022-04-15

## 2022-04-15 RX ORDER — FUROSEMIDE 20 MG/1
20 TABLET ORAL
Qty: 30 | Refills: 0 | Status: DISCONTINUED | COMMUNITY
End: 2022-04-15

## 2022-04-15 RX ORDER — APIXABAN 2.5 MG/1
2.5 TABLET, FILM COATED ORAL
Refills: 0 | Status: ACTIVE | COMMUNITY

## 2022-04-15 RX ORDER — APIXABAN 2.5 MG/1
2.5 TABLET, FILM COATED ORAL
Qty: 60 | Refills: 1 | Status: DISCONTINUED | COMMUNITY
End: 2022-04-15

## 2022-04-15 RX ORDER — ASPIRIN ENTERIC COATED TABLETS 81 MG 81 MG/1
81 TABLET, DELAYED RELEASE ORAL DAILY
Qty: 30 | Refills: 6 | Status: DISCONTINUED | COMMUNITY
Start: 2019-03-21 | End: 2022-04-15

## 2022-04-15 RX ORDER — POTASSIUM CHLORIDE 1500 MG/1
20 TABLET, FILM COATED, EXTENDED RELEASE ORAL
Qty: 30 | Refills: 1 | Status: DISCONTINUED | COMMUNITY
Start: 2021-11-24 | End: 2022-04-15

## 2022-04-15 RX ORDER — METOPROLOL SUCCINATE 25 MG/1
25 TABLET, EXTENDED RELEASE ORAL DAILY
Qty: 15 | Refills: 0 | Status: DISCONTINUED | COMMUNITY
End: 2022-04-15

## 2022-04-15 RX ORDER — ACETAMINOPHEN 325 MG/1
325 TABLET ORAL
Refills: 0 | Status: ACTIVE | COMMUNITY

## 2022-04-21 DIAGNOSIS — I34.0 NONRHEUMATIC MITRAL (VALVE) INSUFFICIENCY: ICD-10-CM

## 2022-04-21 DIAGNOSIS — I50.32 CHRONIC DIASTOLIC (CONGESTIVE) HEART FAILURE: ICD-10-CM

## 2022-04-21 DIAGNOSIS — I35.0 NONRHEUMATIC AORTIC (VALVE) STENOSIS: ICD-10-CM

## 2022-04-21 DIAGNOSIS — I97.790 OTHER INTRAOPERATIVE CARDIAC FUNCTIONAL DISTURBANCES DURING CARDIAC SURGERY: ICD-10-CM

## 2022-04-21 DIAGNOSIS — I48.91 UNSPECIFIED ATRIAL FIBRILLATION: ICD-10-CM

## 2022-04-21 DIAGNOSIS — Z00.6 ENCOUNTER FOR EXAMINATION FOR NORMAL COMPARISON AND CONTROL IN CLINICAL RESEARCH PROGRAM: ICD-10-CM

## 2022-04-21 DIAGNOSIS — R45.1 RESTLESSNESS AND AGITATION: ICD-10-CM

## 2022-04-21 DIAGNOSIS — I44.2 ATRIOVENTRICULAR BLOCK, COMPLETE: ICD-10-CM

## 2022-04-21 DIAGNOSIS — Z91.14 PATIENT'S OTHER NONCOMPLIANCE WITH MEDICATION REGIMEN: ICD-10-CM

## 2022-04-21 DIAGNOSIS — E78.5 HYPERLIPIDEMIA, UNSPECIFIED: ICD-10-CM

## 2022-04-21 DIAGNOSIS — Z79.01 LONG TERM (CURRENT) USE OF ANTICOAGULANTS: ICD-10-CM

## 2022-04-21 DIAGNOSIS — I11.0 HYPERTENSIVE HEART DISEASE WITH HEART FAILURE: ICD-10-CM

## 2022-04-25 ENCOUNTER — APPOINTMENT (OUTPATIENT)
Dept: CARDIOTHORACIC SURGERY | Facility: CLINIC | Age: 82
End: 2022-04-25
Payer: MEDICARE

## 2022-04-26 NOTE — HISTORY OF PRESENT ILLNESS
[FreeTextEntry1] : 81 year old female with a history of hypertension, hyperlipidemia, Afib (on Eliquis), chronic diastolic heart failure with severe AS s/p BAV on 11/11/2021 who presents for follow up.\par \par Since her last visit, the patient had canceled her TAVR procedure multiple times.  The patient agrees to proceed at this time.  She states she has been feeling some dizziness and some shortness of breath.  She denies chest pain, shortness of breath at rest, syncope, orthopnea, PND, or LE edema.  She states that she is unable to do her daily gym routine every day that she used to be able to do.  She believes her symptoms are from aging and not from her valve.

## 2022-04-26 NOTE — PHYSICAL EXAM
[No Acute Distress] : no acute distress [Normal Conjunctiva] : normal conjunctiva [Soft] : abdomen soft [Non Tender] : non-tender [No Masses/organomegaly] : no masses/organomegaly [Normal] : no rash, no skin lesions [Normal Gait] : normal gait [No Edema] : no edema [No Rash] : no rash [Moves all extremities] : moves all extremities [Alert and Oriented] : alert and oriented [de-identified] : III/VI GORDO

## 2022-05-02 ENCOUNTER — APPOINTMENT (OUTPATIENT)
Dept: CARDIOTHORACIC SURGERY | Facility: CLINIC | Age: 82
End: 2022-05-02
Payer: MEDICARE

## 2022-05-09 ENCOUNTER — OUTPATIENT (OUTPATIENT)
Dept: OUTPATIENT SERVICES | Facility: HOSPITAL | Age: 82
LOS: 1 days | End: 2022-05-09
Payer: MEDICARE

## 2022-05-09 ENCOUNTER — APPOINTMENT (OUTPATIENT)
Dept: CARDIOTHORACIC SURGERY | Facility: CLINIC | Age: 82
End: 2022-05-09
Payer: MEDICARE

## 2022-05-09 ENCOUNTER — NON-APPOINTMENT (OUTPATIENT)
Age: 82
End: 2022-05-09

## 2022-05-09 VITALS
DIASTOLIC BLOOD PRESSURE: 67 MMHG | TEMPERATURE: 97 F | HEART RATE: 36 BPM | HEIGHT: 60 IN | OXYGEN SATURATION: 97 % | SYSTOLIC BLOOD PRESSURE: 155 MMHG | BODY MASS INDEX: 21.6 KG/M2 | RESPIRATION RATE: 17 BRPM | WEIGHT: 110 LBS

## 2022-05-09 DIAGNOSIS — I35.0 NONRHEUMATIC AORTIC (VALVE) STENOSIS: ICD-10-CM

## 2022-05-09 DIAGNOSIS — Z98.890 OTHER SPECIFIED POSTPROCEDURAL STATES: Chronic | ICD-10-CM

## 2022-05-09 LAB
ALBUMIN SERPL ELPH-MCNC: 3.8 G/DL — SIGNIFICANT CHANGE UP (ref 3.3–5)
ALP SERPL-CCNC: 99 U/L — SIGNIFICANT CHANGE UP (ref 40–120)
ALT FLD-CCNC: 43 U/L — SIGNIFICANT CHANGE UP (ref 10–45)
ANION GAP SERPL CALC-SCNC: 10 MMOL/L — SIGNIFICANT CHANGE UP (ref 5–17)
APTT BLD: 29.4 SEC — SIGNIFICANT CHANGE UP (ref 27.5–35.5)
AST SERPL-CCNC: 37 U/L — SIGNIFICANT CHANGE UP (ref 10–40)
BASOPHILS # BLD AUTO: 0.04 K/UL — SIGNIFICANT CHANGE UP (ref 0–0.2)
BASOPHILS NFR BLD AUTO: 0.5 % — SIGNIFICANT CHANGE UP (ref 0–2)
BILIRUB SERPL-MCNC: 0.2 MG/DL — SIGNIFICANT CHANGE UP (ref 0.2–1.2)
BUN SERPL-MCNC: 18 MG/DL — SIGNIFICANT CHANGE UP (ref 7–23)
CALCIUM SERPL-MCNC: 9 MG/DL — SIGNIFICANT CHANGE UP (ref 8.4–10.5)
CHLORIDE SERPL-SCNC: 106 MMOL/L — SIGNIFICANT CHANGE UP (ref 96–108)
CO2 SERPL-SCNC: 26 MMOL/L — SIGNIFICANT CHANGE UP (ref 22–31)
CREAT SERPL-MCNC: 0.82 MG/DL — SIGNIFICANT CHANGE UP (ref 0.5–1.3)
EGFR: 71 ML/MIN/1.73M2 — SIGNIFICANT CHANGE UP
EOSINOPHIL # BLD AUTO: 0.15 K/UL — SIGNIFICANT CHANGE UP (ref 0–0.5)
EOSINOPHIL NFR BLD AUTO: 2 % — SIGNIFICANT CHANGE UP (ref 0–6)
GLUCOSE SERPL-MCNC: 78 MG/DL — SIGNIFICANT CHANGE UP (ref 70–99)
HCT VFR BLD CALC: 38.3 % — SIGNIFICANT CHANGE UP (ref 34.5–45)
HGB BLD-MCNC: 12.3 G/DL — SIGNIFICANT CHANGE UP (ref 11.5–15.5)
IMM GRANULOCYTES NFR BLD AUTO: 0.4 % — SIGNIFICANT CHANGE UP (ref 0–1.5)
INR BLD: 1.06 — SIGNIFICANT CHANGE UP (ref 0.88–1.16)
LYMPHOCYTES # BLD AUTO: 1.34 K/UL — SIGNIFICANT CHANGE UP (ref 1–3.3)
LYMPHOCYTES # BLD AUTO: 18 % — SIGNIFICANT CHANGE UP (ref 13–44)
MCHC RBC-ENTMCNC: 30.1 PG — SIGNIFICANT CHANGE UP (ref 27–34)
MCHC RBC-ENTMCNC: 32.1 GM/DL — SIGNIFICANT CHANGE UP (ref 32–36)
MCV RBC AUTO: 93.9 FL — SIGNIFICANT CHANGE UP (ref 80–100)
MONOCYTES # BLD AUTO: 0.68 K/UL — SIGNIFICANT CHANGE UP (ref 0–0.9)
MONOCYTES NFR BLD AUTO: 9.2 % — SIGNIFICANT CHANGE UP (ref 2–14)
NEUTROPHILS # BLD AUTO: 5.19 K/UL — SIGNIFICANT CHANGE UP (ref 1.8–7.4)
NEUTROPHILS NFR BLD AUTO: 69.9 % — SIGNIFICANT CHANGE UP (ref 43–77)
NRBC # BLD: 0 /100 WBCS — SIGNIFICANT CHANGE UP (ref 0–0)
PLATELET # BLD AUTO: 158 K/UL — SIGNIFICANT CHANGE UP (ref 150–400)
POTASSIUM SERPL-MCNC: 4.3 MMOL/L — SIGNIFICANT CHANGE UP (ref 3.5–5.3)
POTASSIUM SERPL-SCNC: 4.3 MMOL/L — SIGNIFICANT CHANGE UP (ref 3.5–5.3)
PROT SERPL-MCNC: 6.2 G/DL — SIGNIFICANT CHANGE UP (ref 6–8.3)
PROTHROM AB SERPL-ACNC: 12.6 SEC — SIGNIFICANT CHANGE UP (ref 10.5–13.4)
RBC # BLD: 4.08 M/UL — SIGNIFICANT CHANGE UP (ref 3.8–5.2)
RBC # FLD: 14.6 % — HIGH (ref 10.3–14.5)
SODIUM SERPL-SCNC: 142 MMOL/L — SIGNIFICANT CHANGE UP (ref 135–145)
WBC # BLD: 7.43 K/UL — SIGNIFICANT CHANGE UP (ref 3.8–10.5)
WBC # FLD AUTO: 7.43 K/UL — SIGNIFICANT CHANGE UP (ref 3.8–10.5)

## 2022-05-09 PROCEDURE — 99214 OFFICE O/P EST MOD 30 MIN: CPT

## 2022-05-09 PROCEDURE — 93000 ELECTROCARDIOGRAM COMPLETE: CPT

## 2022-05-09 PROCEDURE — 36415 COLL VENOUS BLD VENIPUNCTURE: CPT

## 2022-05-09 PROCEDURE — 85610 PROTHROMBIN TIME: CPT

## 2022-05-09 PROCEDURE — 85730 THROMBOPLASTIN TIME PARTIAL: CPT

## 2022-05-09 PROCEDURE — 85025 COMPLETE CBC W/AUTO DIFF WBC: CPT

## 2022-05-09 PROCEDURE — 80053 COMPREHEN METABOLIC PANEL: CPT

## 2022-05-10 RX ORDER — FUROSEMIDE 20 MG/1
20 TABLET ORAL
Refills: 0 | Status: DISCONTINUED | COMMUNITY
End: 2022-05-10

## 2022-05-13 ENCOUNTER — TRANSCRIPTION ENCOUNTER (OUTPATIENT)
Age: 82
End: 2022-05-13

## 2022-05-30 NOTE — HISTORY OF PRESENT ILLNESS
[FreeTextEntry1] : 81 year old female with a history of hypertension, hyperlipidemia, Afib (on Eliquis), chronic diastolic heart failure with severe AS s/p BAV on 11/11/2021 who underwent a transfemoral TAVR on 4/12/2022 (Evolut Pro, 29mm) who presents for follow up.\par \par Intraoperatively, pt went into complete heart block during deployment then converted back to NSR with frequent PVCs.  Upon awaking from sedation, patient was extremely hostile and yelling profanities at staff and pulling at lines. No confusion. Reassured patient with no success. Started low dose Precedex for patient safety and placed mittens on hands. EP consulted for intraoperative CHB. POD 0 patient became severely agitated, self DC'd TVP, required emergent TVP placement.  POD 1 EP consulted, states there is no need for PPM at this time.   Patient remained hostile and agitated, kicking a nurse overnight. POD2 decreased agitation, TVP removed and precedex stopped. Per EP, NTD can follow up with their clinic as an outpatient. BB held for CHB and low blood pressure. Patient and family educated on the benefits and importance of wearing the MCOT and restarting eliquis at home tonight.  The patient was discharged home on post op day 2.  \par \par Since discharge, the patient states she has been feeling a lack of energy.  She feels very weak since the procedure.  She has stopped taking all of her medication as of a week ago.  She denies chest pain, shortness of breath at rest or with exertion, dizziness, syncope, palpitations, orthopnea, PND, or LE edema.

## 2022-05-30 NOTE — PHYSICAL EXAM
[Clear Lung Fields] : clear lung fields [No Respiratory Distress] : no respiratory distress  [de-identified] : irregular rhythm

## 2022-06-20 ENCOUNTER — APPOINTMENT (OUTPATIENT)
Dept: CARDIOTHORACIC SURGERY | Facility: CLINIC | Age: 82
End: 2022-06-20

## 2023-01-01 NOTE — PROGRESS NOTE ADULT - TIME-BASED BILLING (NON-CRITICAL CARE)
active with stimulation
Time-based billing (NON-critical care)
Time-based billing (NON-critical care)

## 2023-05-22 ENCOUNTER — APPOINTMENT (OUTPATIENT)
Dept: CARDIOTHORACIC SURGERY | Facility: CLINIC | Age: 83
End: 2023-05-22

## 2023-06-05 ENCOUNTER — NON-APPOINTMENT (OUTPATIENT)
Age: 83
End: 2023-06-05

## 2023-06-12 ENCOUNTER — APPOINTMENT (OUTPATIENT)
Dept: CARDIOTHORACIC SURGERY | Facility: CLINIC | Age: 83
End: 2023-06-12

## 2023-06-12 ENCOUNTER — OUTPATIENT (OUTPATIENT)
Dept: OUTPATIENT SERVICES | Facility: HOSPITAL | Age: 83
LOS: 1 days | End: 2023-06-12

## 2023-06-12 DIAGNOSIS — Z98.890 OTHER SPECIFIED POSTPROCEDURAL STATES: Chronic | ICD-10-CM

## 2023-06-12 DIAGNOSIS — I35.0 NONRHEUMATIC AORTIC (VALVE) STENOSIS: ICD-10-CM

## 2023-09-08 NOTE — PHYSICAL EXAM
[No Acute Distress] : no acute distress [Normal Conjunctiva] : normal conjunctiva [Clear Lung Fields] : clear lung fields [No Respiratory Distress] : no respiratory distress  [Soft] : abdomen soft [Non Tender] : non-tender [No Masses/organomegaly] : no masses/organomegaly [Normal Gait] : normal gait [No Edema] : no edema [Normal] : no rash, no skin lesions [No Rash] : no rash [Moves all extremities] : moves all extremities [Alert and Oriented] : alert and oriented [de-identified] : irregular rhythm

## 2023-09-08 NOTE — HISTORY OF PRESENT ILLNESS
[FreeTextEntry1] : 82 year old female with a history of hypertension, hyperlipidemia, Afib (on Eliquis), chronic diastolic heart failure with severe AS s/p BAV on 11/11/2021 who underwent a transfemoral TAVR on 4/12/2022 (Evolut Pro, 29mm) who presents for follow up.  Since her last visit, the patient states  The patient is scheduled for an ECHO today.

## 2023-09-11 ENCOUNTER — APPOINTMENT (OUTPATIENT)
Dept: CARDIOTHORACIC SURGERY | Facility: CLINIC | Age: 83
End: 2023-09-11
Payer: MEDICARE

## 2023-09-11 ENCOUNTER — OUTPATIENT (OUTPATIENT)
Dept: OUTPATIENT SERVICES | Facility: HOSPITAL | Age: 83
LOS: 1 days | End: 2023-09-11
Payer: MEDICARE

## 2023-09-11 VITALS
DIASTOLIC BLOOD PRESSURE: 74 MMHG | RESPIRATION RATE: 17 BRPM | HEART RATE: 30 BPM | SYSTOLIC BLOOD PRESSURE: 173 MMHG | WEIGHT: 114 LBS | HEIGHT: 60 IN | TEMPERATURE: 97.4 F | BODY MASS INDEX: 22.38 KG/M2 | OXYGEN SATURATION: 98 %

## 2023-09-11 DIAGNOSIS — I35.0 NONRHEUMATIC AORTIC (VALVE) STENOSIS: ICD-10-CM

## 2023-09-11 DIAGNOSIS — Z98.890 OTHER SPECIFIED POSTPROCEDURAL STATES: Chronic | ICD-10-CM

## 2023-09-11 PROCEDURE — 99214 OFFICE O/P EST MOD 30 MIN: CPT

## 2023-09-11 PROCEDURE — 93010 ELECTROCARDIOGRAM REPORT: CPT

## 2023-09-11 PROCEDURE — 93306 TTE W/DOPPLER COMPLETE: CPT

## 2023-09-11 PROCEDURE — 93005 ELECTROCARDIOGRAM TRACING: CPT

## 2023-09-11 PROCEDURE — 93306 TTE W/DOPPLER COMPLETE: CPT | Mod: 26

## 2023-09-12 PROBLEM — I35.0 AORTIC STENOSIS, SEVERE: Status: ACTIVE | Noted: 2019-04-11

## 2025-07-21 NOTE — ED ADULT NURSE NOTE - VOIDING
POA to [facility]  > Elevate heels of bed  > Allevyn daily and monitoring Qshift  > Outpatient f/u with Podiatry    without difficulty

## (undated) DEVICE — MANIFOLD ANGIO AUTOMD TRNDCR

## (undated) DEVICE — PACK HYBRID LHH

## (undated) DEVICE — SYR MED A2000 SYRINGE KIT ACIST REUS

## (undated) DEVICE — TUBING EXTENSION HI PRESSURE FLEX 48"

## (undated) DEVICE — SUT HOLDER INSERT FOR OCTOBASE STERNAL RETRACTOR

## (undated) DEVICE — WARMING BLANKET FULL UNDERBODY

## (undated) DEVICE — SYS DEL CONTROLLER ANGIOTOUCH

## (undated) DEVICE — PACING CABLE (BROWN) A/V TEMP SCREW DOWN 12FT

## (undated) DEVICE — DRAPE ULTRASOUND TRANSDUCER COVER

## (undated) DEVICE — ELCTR ZOLL DEFIBRILLATOR PAD NO REPLACEMENT

## (undated) DEVICE — BAND RADIAL COMPRESSION DEVICE REG 24CM

## (undated) DEVICE — CATH CARDIAC RT CUSET 601201319

## (undated) DEVICE — Device

## (undated) DEVICE — NDL PERCU ECHOTIP 21G X 4CM

## (undated) DEVICE — DRSG BIOPATCH DISK W CHG 1" W 4.0MM HOLE